# Patient Record
Sex: FEMALE | Race: WHITE | Employment: FULL TIME | URBAN - METROPOLITAN AREA
[De-identification: names, ages, dates, MRNs, and addresses within clinical notes are randomized per-mention and may not be internally consistent; named-entity substitution may affect disease eponyms.]

---

## 2018-06-23 ENCOUNTER — HOSPITAL ENCOUNTER (EMERGENCY)
Age: 28
Discharge: HOME | End: 2018-06-24
Attending: EMERGENCY MEDICINE | Admitting: EMERGENCY MEDICINE
Payer: SELF-PAY

## 2018-06-23 DIAGNOSIS — R51.9 ACUTE NONINTRACTABLE HEADACHE, UNSPECIFIED HEADACHE TYPE: Primary | ICD-10-CM

## 2018-06-23 DIAGNOSIS — R11.2 NON-INTRACTABLE VOMITING WITH NAUSEA, UNSPECIFIED VOMITING TYPE: ICD-10-CM

## 2018-06-23 PROCEDURE — 74011250636 HC RX REV CODE- 250/636: Performed by: EMERGENCY MEDICINE

## 2018-06-23 PROCEDURE — 96374 THER/PROPH/DIAG INJ IV PUSH: CPT

## 2018-06-23 PROCEDURE — 96375 TX/PRO/DX INJ NEW DRUG ADDON: CPT

## 2018-06-23 PROCEDURE — 99284 EMERGENCY DEPT VISIT MOD MDM: CPT

## 2018-06-23 PROCEDURE — 74011000250 HC RX REV CODE- 250: Performed by: EMERGENCY MEDICINE

## 2018-06-23 PROCEDURE — 96361 HYDRATE IV INFUSION ADD-ON: CPT

## 2018-06-23 RX ORDER — LORAZEPAM 2 MG/ML
0.5 INJECTION INTRAMUSCULAR ONCE
Status: COMPLETED | OUTPATIENT
Start: 2018-06-23 | End: 2018-06-24

## 2018-06-23 RX ORDER — DEXAMETHASONE SODIUM PHOSPHATE 10 MG/ML
10 INJECTION INTRAMUSCULAR; INTRAVENOUS
Status: COMPLETED | OUTPATIENT
Start: 2018-06-23 | End: 2018-06-24

## 2018-06-23 RX ORDER — DIPHENHYDRAMINE HYDROCHLORIDE 50 MG/ML
25 INJECTION, SOLUTION INTRAMUSCULAR; INTRAVENOUS
Status: COMPLETED | OUTPATIENT
Start: 2018-06-23 | End: 2018-06-23

## 2018-06-23 RX ADMIN — DIPHENHYDRAMINE HYDROCHLORIDE 25 MG: 50 INJECTION, SOLUTION INTRAMUSCULAR; INTRAVENOUS at 22:42

## 2018-06-23 RX ADMIN — SODIUM CHLORIDE 10 MG: 9 INJECTION INTRAMUSCULAR; INTRAVENOUS; SUBCUTANEOUS at 22:42

## 2018-06-23 RX ADMIN — SODIUM CHLORIDE 1000 ML: 900 INJECTION, SOLUTION INTRAVENOUS at 22:42

## 2018-06-24 VITALS
TEMPERATURE: 98.2 F | BODY MASS INDEX: 23.04 KG/M2 | WEIGHT: 130 LBS | SYSTOLIC BLOOD PRESSURE: 98 MMHG | RESPIRATION RATE: 16 BRPM | DIASTOLIC BLOOD PRESSURE: 58 MMHG | OXYGEN SATURATION: 100 % | HEART RATE: 83 BPM | HEIGHT: 63 IN

## 2018-06-24 PROCEDURE — 96375 TX/PRO/DX INJ NEW DRUG ADDON: CPT

## 2018-06-24 PROCEDURE — 74011250636 HC RX REV CODE- 250/636: Performed by: EMERGENCY MEDICINE

## 2018-06-24 RX ORDER — ONDANSETRON 8 MG/1
8 TABLET, ORALLY DISINTEGRATING ORAL
Qty: 12 TAB | Refills: 0 | Status: SHIPPED | OUTPATIENT
Start: 2018-06-24 | End: 2018-07-01

## 2018-06-24 RX ADMIN — DEXAMETHASONE SODIUM PHOSPHATE 10 MG: 10 INJECTION, SOLUTION INTRAMUSCULAR; INTRAVENOUS at 00:06

## 2018-06-24 RX ADMIN — LORAZEPAM 0.5 MG: 2 INJECTION INTRAMUSCULAR; INTRAVENOUS at 00:06

## 2018-06-24 NOTE — ED TRIAGE NOTES
Patient arrives for complaint of headache that started about 2 weeks ago and has since worsened. No relief with tylenol and ibuprofen.

## 2018-06-24 NOTE — DISCHARGE INSTRUCTIONS
Headache: Care Instructions  Your Care Instructions    Headaches have many possible causes. Most headaches aren't a sign of a more serious problem, and they will get better on their own. Home treatment may help you feel better faster. The doctor has checked you carefully, but problems can develop later. If you notice any problems or new symptoms, get medical treatment right away. Follow-up care is a key part of your treatment and safety. Be sure to make and go to all appointments, and call your doctor if you are having problems. It's also a good idea to know your test results and keep a list of the medicines you take. How can you care for yourself at home? · Do not drive if you have taken a prescription pain medicine. · Rest in a quiet, dark room until your headache is gone. Close your eyes and try to relax or go to sleep. Don't watch TV or read. · Put a cold, moist cloth or cold pack on the painful area for 10 to 20 minutes at a time. Put a thin cloth between the cold pack and your skin. · Use a warm, moist towel or a heating pad set on low to relax tight shoulder and neck muscles. · Have someone gently massage your neck and shoulders. · Take pain medicines exactly as directed. ¨ If the doctor gave you a prescription medicine for pain, take it as prescribed. ¨ If you are not taking a prescription pain medicine, ask your doctor if you can take an over-the-counter medicine. · Be careful not to take pain medicine more often than the instructions allow, because you may get worse or more frequent headaches when the medicine wears off. · Do not ignore new symptoms that occur with a headache, such as a fever, weakness or numbness, vision changes, or confusion. These may be signs of a more serious problem. To prevent headaches  · Keep a headache diary so you can figure out what triggers your headaches. Avoiding triggers may help you prevent headaches.  Record when each headache began, how long it lasted, and what the pain was like (throbbing, aching, stabbing, or dull). Write down any other symptoms you had with the headache, such as nausea, flashing lights or dark spots, or sensitivity to bright light or loud noise. Note if the headache occurred near your period. List anything that might have triggered the headache, such as certain foods (chocolate, cheese, wine) or odors, smoke, bright light, stress, or lack of sleep. · Find healthy ways to deal with stress. Headaches are most common during or right after stressful times. Take time to relax before and after you do something that has caused a headache in the past.  · Try to keep your muscles relaxed by keeping good posture. Check your jaw, face, neck, and shoulder muscles for tension, and try relaxing them. When sitting at a desk, change positions often, and stretch for 30 seconds each hour. · Get plenty of sleep and exercise. · Eat regularly and well. Long periods without food can trigger a headache. · Treat yourself to a massage. Some people find that regular massages are very helpful in relieving tension. · Limit caffeine by not drinking too much coffee, tea, or soda. But don't quit caffeine suddenly, because that can also give you headaches. · Reduce eyestrain from computers by blinking frequently and looking away from the computer screen every so often. Make sure you have proper eyewear and that your monitor is set up properly, about an arm's length away. · Seek help if you have depression or anxiety. Your headaches may be linked to these conditions. Treatment can both prevent headaches and help with symptoms of anxiety or depression. When should you call for help? Call 911 anytime you think you may need emergency care. For example, call if:  ? · You have signs of a stroke. These may include:  ¨ Sudden numbness, paralysis, or weakness in your face, arm, or leg, especially on only one side of your body. ¨ Sudden vision changes.   ¨ Sudden trouble speaking. ¨ Sudden confusion or trouble understanding simple statements. ¨ Sudden problems with walking or balance. ¨ A sudden, severe headache that is different from past headaches. ?Call your doctor now or seek immediate medical care if:  ? · You have a new or worse headache. ? · Your headache gets much worse. Where can you learn more? Go to http://silvia-jocelynn.info/. Enter M271 in the search box to learn more about \"Headache: Care Instructions. \"  Current as of: October 14, 2016  Content Version: 11.4  © 9448-8759 [employer]. Care instructions adapted under license by [employer] (which disclaims liability or warranty for this information). If you have questions about a medical condition or this instruction, always ask your healthcare professional. [address] any warranty or liability for your use of this information. Nausea and Vomiting: Care Instructions  Your Care Instructions    When you are nauseated, you may feel weak and sweaty and notice a lot of saliva in your mouth. Nausea often leads to vomiting. Most of the time you do not need to worry about nausea and vomiting, but they can be signs of other illnesses. Two common causes of nausea and vomiting are stomach flu and food poisoning. Nausea and vomiting from viral stomach flu will usually start to improve within 24 hours. Nausea and vomiting from food poisoning may last from 12 to 48 hours. The doctor has checked you carefully, but problems can develop later. If you notice any problems or new symptoms, get medical treatment right away. Follow-up care is a key part of your treatment and safety. Be sure to make and go to all appointments, and call your doctor if you are having problems. It's also a good idea to know your test results and keep a list of the medicines you take. How can you care for yourself at home?   · To prevent dehydration, drink plenty of fluids, enough so that your urine is light yellow or clear like water. Choose water and other caffeine-free clear liquids until you feel better. If you have kidney, heart, or liver disease and have to limit fluids, talk with your doctor before you increase the amount of fluids you drink. · Rest in bed until you feel better. · When you are able to eat, try clear soups, mild foods, and liquids until all symptoms are gone for 12 to 48 hours. Other good choices include dry toast, crackers, cooked cereal, and gelatin dessert, such as Jell-O. When should you call for help? Call 911 anytime you think you may need emergency care. For example, call if:  ? · You passed out (lost consciousness). ?Call your doctor now or seek immediate medical care if:  ? · You have symptoms of dehydration, such as:  ¨ Dry eyes and a dry mouth. ¨ Passing only a little dark urine. ¨ Feeling thirstier than usual.   ? · You have new or worsening belly pain. ? · You have a new or higher fever. ? · You vomit blood or what looks like coffee grounds. ? Watch closely for changes in your health, and be sure to contact your doctor if:  ? · You have ongoing nausea and vomiting. ? · Your vomiting is getting worse. ? · Your vomiting lasts longer than 2 days. ? · You are not getting better as expected. Where can you learn more? Go to http://silvia-jocelynn.info/. Enter 25 [id number] in the search box to learn more about \"Nausea and Vomiting: Care Instructions. \"  Current as of: March 20, 2017  Content Version: 11.4  © 4578-0356 [employer]. Care instructions adapted under license by [employer] (which disclaims liability or warranty for this information). If you have questions about a medical condition or this instruction, always ask your healthcare professional. [address] any warranty or liability for your use of this information.

## 2018-06-24 NOTE — ED PROVIDER NOTES
HPI Comments: 33 y.o. female with past medical history significant for migraine who presents via private vehicle with chief complaint of HA. Pt reports gradual onset of HA that is localized to the left side of her forehead and behind her left eye 2 weeks ago that has persisted, progressively worsening in severity since onset. Pt reports currently her pain is 10/10 and notes nausea and vomiting x 2 today. Pt endorses previous hx of migraines but states her pain has never been this severe. Pt also notes chronic extremity numbness and weakness that remain unchanged today. Pt reports she has had a head CT in the past for HA. Pt denies facial numbness and facial weakness. Pt denies possibility of pregnancy. Pt denies fever, CP and SOB. There are no other acute medical concerns at this time. Social hx: , tobacco smoker  PCP: Fracisco Flores MD    Note written by Lucy Waters, as dictated by Lizbet Rodriguez MD 10:35 PM      The history is provided by the patient. No past medical history on file. No past surgical history on file. Family History:   Problem Relation Age of Onset    Alcohol abuse Mother     Substance Abuse Mother     Alcohol abuse Father     Diabetes Maternal Grandmother     Heart Disease Maternal Grandmother        Social History     Social History    Marital status:      Spouse name: N/A    Number of children: N/A    Years of education: N/A     Occupational History    Not on file. Social History Main Topics    Smoking status: Current Every Day Smoker     Packs/day: 0.50     Years: 2.00     Last attempt to quit: 3/28/2009    Smokeless tobacco: Not on file    Alcohol use No    Drug use: Not on file    Sexual activity: Not on file     Other Topics Concern    Not on file     Social History Narrative    ** Merged History Encounter **              ALLERGIES: Latex;  Ceclor [cefaclor]; and Ceclor [cefaclor]    Review of Systems   Constitutional: Negative for fever. Respiratory: Negative for shortness of breath. Cardiovascular: Negative for chest pain. Gastrointestinal: Positive for nausea and vomiting. Neurological: Positive for headaches. All other systems reviewed and are negative. Vitals:    06/23/18 2146   BP: 123/76   Pulse: 83   Resp: 16   Temp: 98.2 °F (36.8 °C)   SpO2: 100%   Weight: 59 kg (130 lb)   Height: 5' 3\" (1.6 m)            Physical Exam   Constitutional: She is oriented to person, place, and time. She appears well-developed and well-nourished. HENT:   Head: Normocephalic and atraumatic. Mouth/Throat: Oropharynx is clear and moist.   Eyes: Conjunctivae are normal.   Neck: Normal range of motion. Neck supple. Cardiovascular: Normal rate, regular rhythm and normal heart sounds. Pulmonary/Chest: Effort normal and breath sounds normal.   Abdominal: Soft. Bowel sounds are normal. There is no tenderness. Musculoskeletal: Normal range of motion. She exhibits no edema or tenderness. Neurological: She is alert and oriented to person, place, and time. Skin: Skin is warm and dry. Psychiatric: She has a normal mood and affect. Her behavior is normal.   Nursing note and vitals reviewed. Note written by Lucy Daly, as dictated by Polo Reinoso MD 10:35 PM    [facility]      ED Course       Procedures    PROGRESS NOTE:  11:33 PM  Pt reports she is feeling jittery after compazine but her HA is currently a 3/10. PROGRESS NOTE:  12:19 AM  Pain is a 3/10, she feels well enough to go home. A/P:  1. Headache - improved. Recommend Excedrin as needed. 2. N/V - Zofran prn. Patient's results have been reviewed with them. Patient and/or family have verbally conveyed their understanding and agreement of the patient's signs, symptoms, diagnosis, treatment and prognosis and additionally agree to follow up as recommended or return to the Emergency Room should their condition change prior to follow-up.   Discharge instructions have also been provided to the patient with some educational information regarding their diagnosis as well a list of reasons why they would want to return to the ER prior to their follow-up appointment should their condition change.

## 2020-05-08 ENCOUNTER — APPOINTMENT (OUTPATIENT)
Dept: CT IMAGING | Age: 30
End: 2020-05-08
Attending: NURSE PRACTITIONER
Payer: MEDICAID

## 2020-05-08 ENCOUNTER — HOSPITAL ENCOUNTER (EMERGENCY)
Age: 30
Discharge: HOME | End: 2020-05-08
Attending: STUDENT IN AN ORGANIZED HEALTH CARE EDUCATION/TRAINING PROGRAM
Payer: MEDICAID

## 2020-05-08 VITALS
TEMPERATURE: 97.4 F | BODY MASS INDEX: 26.22 KG/M2 | HEART RATE: 91 BPM | RESPIRATION RATE: 16 BRPM | HEIGHT: 63 IN | OXYGEN SATURATION: 100 % | SYSTOLIC BLOOD PRESSURE: 136 MMHG | DIASTOLIC BLOOD PRESSURE: 79 MMHG | WEIGHT: 148 LBS

## 2020-05-08 DIAGNOSIS — R10.84 ABDOMINAL PAIN, GENERALIZED: Primary | ICD-10-CM

## 2020-05-08 DIAGNOSIS — K59.00 CONSTIPATION, UNSPECIFIED CONSTIPATION TYPE: ICD-10-CM

## 2020-05-08 LAB
ALBUMIN SERPL-MCNC: 4.3 G/DL (ref 3.5–5)
ALBUMIN/GLOB SERPL: 1.1 {RATIO} (ref 1.1–2.2)
ALP SERPL-CCNC: 89 U/L (ref 45–117)
ALT SERPL-CCNC: 21 U/L (ref 12–78)
ANION GAP SERPL CALC-SCNC: 5 MMOL/L (ref 5–15)
APPEARANCE UR: CLEAR
AST SERPL-CCNC: 12 U/L (ref 15–37)
BACTERIA URNS QL MICRO: NEGATIVE /HPF
BASOPHILS # BLD: 0 K/UL (ref 0–0.1)
BASOPHILS NFR BLD: 0 % (ref 0–1)
BILIRUB SERPL-MCNC: 0.5 MG/DL (ref 0.2–1)
BILIRUB UR QL: NEGATIVE
BUN SERPL-MCNC: 11 MG/DL (ref 6–20)
BUN/CREAT SERPL: 13 (ref 12–20)
CALCIUM SERPL-MCNC: 9 MG/DL (ref 8.5–10.1)
CHLORIDE SERPL-SCNC: 105 MMOL/L (ref 97–108)
CO2 SERPL-SCNC: 27 MMOL/L (ref 21–32)
COLOR UR: NORMAL
COMMENT, HOLDF: NORMAL
CREAT SERPL-MCNC: 0.83 MG/DL (ref 0.55–1.02)
DIFFERENTIAL METHOD BLD: NORMAL
EOSINOPHIL # BLD: 0.3 K/UL (ref 0–0.4)
EOSINOPHIL NFR BLD: 4 % (ref 0–7)
EPITH CASTS URNS QL MICRO: NORMAL /LPF
ERYTHROCYTE [DISTWIDTH] IN BLOOD BY AUTOMATED COUNT: 12.5 % (ref 11.5–14.5)
GLOBULIN SER CALC-MCNC: 3.9 G/DL (ref 2–4)
GLUCOSE SERPL-MCNC: 107 MG/DL (ref 65–100)
GLUCOSE UR STRIP.AUTO-MCNC: NEGATIVE MG/DL
HCG UR QL: NEGATIVE
HCT VFR BLD AUTO: 45.7 % (ref 35–47)
HGB BLD-MCNC: 15.2 G/DL (ref 11.5–16)
HGB UR QL STRIP: NEGATIVE
IMM GRANULOCYTES # BLD AUTO: 0 K/UL (ref 0–0.04)
IMM GRANULOCYTES NFR BLD AUTO: 0 % (ref 0–0.5)
KETONES UR QL STRIP.AUTO: NEGATIVE MG/DL
LEUKOCYTE ESTERASE UR QL STRIP.AUTO: NEGATIVE
LYMPHOCYTES # BLD: 2.7 K/UL (ref 0.8–3.5)
LYMPHOCYTES NFR BLD: 30 % (ref 12–49)
MCH RBC QN AUTO: 30.1 PG (ref 26–34)
MCHC RBC AUTO-ENTMCNC: 33.3 G/DL (ref 30–36.5)
MCV RBC AUTO: 90.5 FL (ref 80–99)
MONOCYTES # BLD: 0.6 K/UL (ref 0–1)
MONOCYTES NFR BLD: 6 % (ref 5–13)
NEUTS SEG # BLD: 5.3 K/UL (ref 1.8–8)
NEUTS SEG NFR BLD: 60 % (ref 32–75)
NITRITE UR QL STRIP.AUTO: NEGATIVE
NRBC # BLD: 0 K/UL (ref 0–0.01)
NRBC BLD-RTO: 0 PER 100 WBC
PH UR STRIP: 5.5 [PH] (ref 5–8)
PLATELET # BLD AUTO: 262 K/UL (ref 150–400)
PMV BLD AUTO: 10 FL (ref 8.9–12.9)
POTASSIUM SERPL-SCNC: 3.7 MMOL/L (ref 3.5–5.1)
PROT SERPL-MCNC: 8.2 G/DL (ref 6.4–8.2)
PROT UR STRIP-MCNC: NEGATIVE MG/DL
RBC # BLD AUTO: 5.05 M/UL (ref 3.8–5.2)
RBC #/AREA URNS HPF: NORMAL /HPF (ref 0–5)
SAMPLES BEING HELD,HOLD: NORMAL
SODIUM SERPL-SCNC: 137 MMOL/L (ref 136–145)
SP GR UR REFRACTOMETRY: 1.02 (ref 1–1.03)
UA: UC IF INDICATED,UAUC: NORMAL
UROBILINOGEN UR QL STRIP.AUTO: 0.2 EU/DL (ref 0.2–1)
WBC # BLD AUTO: 8.9 K/UL (ref 3.6–11)
WBC URNS QL MICRO: NORMAL /HPF (ref 0–4)

## 2020-05-08 PROCEDURE — 85025 COMPLETE CBC W/AUTO DIFF WBC: CPT

## 2020-05-08 PROCEDURE — 81001 URINALYSIS AUTO W/SCOPE: CPT

## 2020-05-08 PROCEDURE — 74011636320 HC RX REV CODE- 636/320: Performed by: RADIOLOGY

## 2020-05-08 PROCEDURE — 81025 URINE PREGNANCY TEST: CPT

## 2020-05-08 PROCEDURE — 80053 COMPREHEN METABOLIC PANEL: CPT

## 2020-05-08 PROCEDURE — 99283 EMERGENCY DEPT VISIT LOW MDM: CPT

## 2020-05-08 PROCEDURE — 36415 COLL VENOUS BLD VENIPUNCTURE: CPT

## 2020-05-08 PROCEDURE — 74177 CT ABD & PELVIS W/CONTRAST: CPT

## 2020-05-08 RX ORDER — POLYETHYLENE GLYCOL 3350 17 G/17G
17 POWDER, FOR SOLUTION ORAL DAILY
Qty: 510 G | Refills: 0 | Status: SHIPPED | OUTPATIENT
Start: 2020-05-08 | End: 2021-04-06

## 2020-05-08 RX ADMIN — IOPAMIDOL 100 ML: 755 INJECTION, SOLUTION INTRAVENOUS at 14:15

## 2020-05-08 NOTE — ED PROVIDER NOTES
This is a 18-year-old female who presents ambulatory to the emergency room with complaints of left flank and lower abdominal pain that has been going on for approximately 3 days. Patient states it was acute onset. Denies any chest pain, shortness of breath, dizziness, fever or chills, nausea or vomiting. Denies any urinary urgency or frequency, hematuria. Last bowel movement was this morning , very small, with no blood noted. Patient states the pain is a 5 out of 10 currently. Has not taken any medication prior to arrival for her pain. There are no further complaints at this time. Mer Roy MD  No past medical history on file. No past surgical history on file. No past medical history on file. No past surgical history on file.       Family History:   Problem Relation Age of Onset    Alcohol abuse Mother     Substance Abuse Mother     Alcohol abuse Father     Diabetes Maternal Grandmother     Heart Disease Maternal Grandmother        Social History     Socioeconomic History    Marital status:      Spouse name: Not on file    Number of children: Not on file    Years of education: Not on file    Highest education level: Not on file   Occupational History    Not on file   Social Needs    Financial resource strain: Not on file    Food insecurity     Worry: Not on file     Inability: Not on file    Transportation needs     Medical: Not on file     Non-medical: Not on file   Tobacco Use    Smoking status: Current Every Day Smoker     Packs/day: 0.50     Years: 2.00     Pack years: 1.00     Last attempt to quit: 3/28/2009     Years since quittin.1   Substance and Sexual Activity    Alcohol use: No    Drug use: Not on file    Sexual activity: Not on file   Lifestyle    Physical activity     Days per week: Not on file     Minutes per session: Not on file    Stress: Not on file   Relationships    Social connections     Talks on phone: Not on file     Gets together: Not on file     Attends [employer] service: Not on file     Active member of club or organization: Not on file     Attends meetings of clubs or organizations: Not on file     Relationship status: Not on file    Intimate partner violence     Fear of current or ex partner: Not on file     Emotionally abused: Not on file     Physically abused: Not on file     Forced sexual activity: Not on file   Other Topics Concern    Not on file   Social History Narrative    ** Merged History Encounter **              ALLERGIES: Latex; Ceclor [cefaclor]; and Ceclor [cefaclor]    Review of Systems   Constitutional: Negative for appetite change, chills, diaphoresis, fatigue and fever. HENT: Negative for congestion, ear discharge, ear pain, sinus pressure, sinus pain, sore throat and trouble swallowing. Eyes: Negative for photophobia, pain, redness and visual disturbance. Respiratory: Negative for chest tightness, shortness of breath and wheezing. Cardiovascular: Negative for chest pain and palpitations. Gastrointestinal: Positive for abdominal pain and constipation. Negative for abdominal distention, nausea and vomiting. Endocrine: Negative. Genitourinary: Negative for difficulty urinating, flank pain, frequency and urgency. Musculoskeletal: Negative for back pain, neck pain and neck stiffness. Skin: Negative for color change, pallor, rash and wound. Allergic/Immunologic: Negative. Neurological: Negative for dizziness, speech difficulty, weakness and headaches. Hematological: Does not bruise/bleed easily. Psychiatric/Behavioral: Negative for behavioral problems. The patient is not nervous/anxious. Vitals:    05/08/20 1209   BP: 136/79   Pulse: 91   Resp: 16   Temp: 97.4 °F (36.3 °C)   SpO2: 100%   Weight: 67.1 kg (148 lb)   Height: 5' 3\" (1.6 m)            Physical Exam  Vitals signs and nursing note reviewed. Constitutional:       General: She is not in acute distress.      Appearance: Normal appearance. She is well-developed. HENT:      Head: Normocephalic and atraumatic. Right Ear: External ear normal.      Left Ear: External ear normal.      Nose: Nose normal.      Mouth/Throat:      Mouth: Mucous membranes are moist.   Eyes:      General:         Right eye: No discharge. Left eye: No discharge. Conjunctiva/sclera: Conjunctivae normal.      Pupils: Pupils are equal, round, and reactive to light. Neck:      Musculoskeletal: Normal range of motion and neck supple. Vascular: No JVD. Trachea: No tracheal deviation. Cardiovascular:      Rate and Rhythm: Normal rate and regular rhythm. Pulses: Normal pulses. Heart sounds: Normal heart sounds. No murmur. No gallop. Pulmonary:      Effort: Pulmonary effort is normal. No respiratory distress. Breath sounds: Normal breath sounds. No wheezing or rales. Chest:      Chest wall: No tenderness. Abdominal:      General: Bowel sounds are normal. There is no distension. Palpations: Abdomen is soft. Tenderness: There is abdominal tenderness. There is no guarding or rebound. Genitourinary:     Comments: Negative    Musculoskeletal: Normal range of motion. General: No tenderness. Skin:     General: Skin is warm and dry. Capillary Refill: Capillary refill takes less than 2 seconds. Coloration: Skin is not pale. Findings: No erythema or rash. Neurological:      General: No focal deficit present. Mental Status: She is alert and oriented to person, place, and time. Motor: No weakness. Coordination: Coordination normal.   Psychiatric:         Mood and Affect: Mood normal.         Behavior: Behavior normal.         Thought Content:  Thought content normal.         Judgment: Judgment normal.          MDM  Number of Diagnoses or Management Options  Abdominal pain, generalized: new and requires workup  Constipation, unspecified constipation type: new and requires workup  Diagnosis management comments: Discharged home and follow-up with PCP. Follow-up with GI as an outpatient. Return to the emergency room with worsening symptoms. Patient in agreement with plan of care. Labs Reviewed   METABOLIC PANEL, COMPREHENSIVE - Abnormal; Notable for the following components:       Result Value    Glucose 107 (*)     AST (SGOT) 12 (*)     All other components within normal limits   URINALYSIS W/ REFLEX CULTURE   CBC WITH AUTOMATED DIFF   SAMPLES BEING HELD   HCG URINE, QL. - POC   SAMPLE TO BLOOD BANK     Ct Abd Pelv W Cont    Result Date: 5/8/2020  IMPRESSION: No acute abdominal or pelvic pathology. 2:39 PM  Pt has been reexamined. Pt has no new complaints, changes or physical findings. Care plan outlined and precautions discussed. All available results were reviewed with pt. All medications were reviewed with pt. All of pt's questions and concerns were addressed. Pt agrees to F/U as instructed and agrees to return to ED upon further deterioration. Pt is ready to go home.   Francisco Javier Thomason NP      Procedures

## 2020-05-08 NOTE — LETTER
[address] 
[facility] EMERGENCY DEPT 
[address] [place] [address] [phone] Work/School Note Date: 5/8/2020 To Whom It May concern: 
 
Dinorah Blankenship was seen and treated today in the emergency room by the following provider(s): 
Attending Provider: Carol Vail MD 
Nurse Practitioner: Gabriela Boas, NP.    
 
 
 
Sincerely,

## 2020-05-08 NOTE — LETTER
[address] 
[facility] EMERGENCY DEPT 
[address] [place] [address] [phone] Work/School Note Date: 5/8/2020 To Whom It May concern: 
 
Nathaly Castellano was seen and treated today in the emergency room by the following provider(s): 
Nurse Practitioner: Beatriz Salcedo NP. Nathaly Castellano may return to work on 5/11/2020. Sincerely, Lidia Hollis RN

## 2020-05-08 NOTE — DISCHARGE INSTRUCTIONS
Patient Education        Abdominal Pain: Care Instructions  Your Care Instructions    Abdominal pain has many possible causes. Some aren't serious and get better on their own in a few days. Others need more testing and treatment. If your pain continues or gets worse, you need to be rechecked and may need more tests to find out what is wrong. You may need surgery to correct the problem. Don't ignore new symptoms, such as fever, nausea and vomiting, urination problems, pain that gets worse, and dizziness. These may be signs of a more serious problem. Your doctor may have recommended a follow-up visit in the next 8 to 12 hours. If you are not getting better, you may need more tests or treatment. The doctor has checked you carefully, but problems can develop later. If you notice any problems or new symptoms, get medical treatment right away. Follow-up care is a key part of your treatment and safety. Be sure to make and go to all appointments, and call your doctor if you are having problems. It's also a good idea to know your test results and keep a list of the medicines you take. How can you care for yourself at home? · Rest until you feel better. · To prevent dehydration, drink plenty of fluids, enough so that your urine is light yellow or clear like water. Choose water and other caffeine-free clear liquids until you feel better. If you have kidney, heart, or liver disease and have to limit fluids, talk with your doctor before you increase the amount of fluids you drink. · If your stomach is upset, eat mild foods, such as rice, dry toast or crackers, bananas, and applesauce. Try eating several small meals instead of two or three large ones. · Wait until 48 hours after all symptoms have gone away before you have spicy foods, alcohol, and drinks that contain caffeine. · Do not eat foods that are high in fat. · Avoid anti-inflammatory medicines such as aspirin, ibuprofen (Advil, Motrin), and naproxen (Aleve). These can cause stomach upset. Talk to your doctor if you take daily aspirin for another health problem. When should you call for help? Call 911 anytime you think you may need emergency care. For example, call if:    · You passed out (lost consciousness).     · You pass maroon or very bloody stools.     · You vomit blood or what looks like coffee grounds.     · You have new, severe belly pain.    Call your doctor now or seek immediate medical care if:    · Your pain gets worse, especially if it becomes focused in one area of your belly.     · You have a new or higher fever.     · Your stools are black and look like tar, or they have streaks of blood.     · You have unexpected vaginal bleeding.     · You have symptoms of a urinary tract infection. These may include:  ? Pain when you urinate. ? Urinating more often than usual.  ? Blood in your urine.     · You are dizzy or lightheaded, or you feel like you may faint.    Watch closely for changes in your health, and be sure to contact your doctor if:    · You are not getting better after 1 day (24 hours). Where can you learn more? Go to http://silviaAyasdijocelynn.info/  Enter X063 in the search box to learn more about \"Abdominal Pain: Care Instructions. \"  Current as of: June 26, 2019Content Version: 12.4  © 9123-4752 Healthwise, Incorporated. Care instructions adapted under license by [employer] (which disclaims liability or warranty for this information). If you have questions about a medical condition or this instruction, always ask your healthcare professional. [place] [address] any warranty or liability for your use of this information. Patient Education        Constipation: Care Instructions  Your Care Instructions    Constipation means that you have a hard time passing stools (bowel movements). People pass stools from 3 times a day to once every 3 days. What is normal for you may be different.  Constipation may occur with pain in the rectum and cramping. The pain may get worse when you try to pass stools. Sometimes there are small amounts of bright red blood on toilet paper or the surface of stools. This is because of enlarged veins near the rectum (hemorrhoids). A few changes in your diet and lifestyle may help you avoid ongoing constipation. Your doctor may also prescribe medicine to help loosen your stool. Some medicines can cause constipation. These include pain medicines and antidepressants. Tell your doctor about all the medicines you take. Your doctor may want to make a medicine change to ease your symptoms. Follow-up care is a key part of your treatment and safety. Be sure to make and go to all appointments, and call your doctor if you are having problems. It's also a good idea to know your test results and keep a list of the medicines you take. How can you care for yourself at home? · Drink plenty of fluids, enough so that your urine is light yellow or clear like water. If you have kidney, heart, or liver disease and have to limit fluids, talk with your doctor before you increase the amount of fluids you drink. · Include high-fiber foods in your diet each day. These include fruits, vegetables, beans, and whole grains. · Get at least 30 minutes of exercise on most days of the week. Walking is a good choice. You also may want to do other activities, such as running, swimming, cycling, or playing tennis or team sports. · Take a fiber supplement, such as Citrucel or Metamucil, every day. Read and follow all instructions on the label. · Schedule time each day for a bowel movement. A daily routine may help. Take your time having your bowel movement. · Support your feet with a small step stool when you sit on the toilet. This helps flex your hips and places your pelvis in a squatting position. · Your doctor may recommend an over-the-counter laxative to relieve your constipation.  Examples are Milk of Magnesia and MiraLax. Read and follow all instructions on the label. Do not use laxatives on a long-term basis. When should you call for help? Call your doctor now or seek immediate medical care if:    · You have new or worse belly pain.     · You have new or worse nausea or vomiting.     · You have blood in your stools.    Watch closely for changes in your health, and be sure to contact your doctor if:    · Your constipation is getting worse.     · You do not get better as expected. Where can you learn more? Go to http://silvia-jocelynn.info/  Enter P343 in the search box to learn more about \"Constipation: Care Instructions. \"  Current as of: June 26, 2019Content Version: 12.4  © 5082-6447 Healthwise, Incorporated. Care instructions adapted under license by [employer] (which disclaims liability or warranty for this information). If you have questions about a medical condition or this instruction, always ask your healthcare professional. [place] [address] any warranty or liability for your use of this information.

## 2020-05-08 NOTE — ED TRIAGE NOTES
Pt reports left side and lower abdominal pain that has been going on x 3 days. Pt denies vomiting, fevers, diarrhea, urinary symptoms.

## 2020-05-11 ENCOUNTER — PATIENT OUTREACH (OUTPATIENT)
Dept: FAMILY MEDICINE CLINIC | Age: 30
End: 2020-05-11

## 2020-05-11 NOTE — PROGRESS NOTES
Patient contacted regarding recent discharge and COVID-19 risk   Care Transition Nurse/ Ambulatory Care Manager contacted the patient by telephone to perform post discharge assessment. Verified name and  with patient as identifiers. Patient has following risk factors of: no known risk factors. CTN/ACM reviewed discharge instructions, medical action plan and red flags related to discharge diagnosis. Reviewed and educated them on any new and changed medications related to discharge diagnosis. Advised obtaining a 90-day supply of all daily and as-needed medications. Education provided regarding infection prevention, and signs and symptoms of COVID-19 and when to seek medical attention with patient who verbalized understanding. Discussed exposure protocols and quarantine from [address] you at higher risk for severe illness  and given an opportunity for questions and concerns. The patient agrees to contact the COVID-19 hotline [phone] or PCP office for questions related to their healthcare. CTN/ACM provided contact information for future reference. From CDC: Are you at higher risk for severe illness?  Wash your hands often.  Avoid close contact (6 feet, which is about two arm lengths) with people who are sick.  Put distance between yourself and other people if COVID-19 is spreading in your community.  Clean and disinfect frequently touched surfaces.  Avoid all cruise travel and non-essential air travel.  Call your healthcare professional if you have concerns about COVID-19 and your underlying condition or if you are sick. For more information on steps you can take to protect yourself, see CDC's How to Protect Yourself      Patient/family/caregiver given information for Christiano Love and agrees to enroll yes  Patient's preferred e-mail:  [email or web address]  Patient's preferred phone number: [phone]  Based on Loop alert triggers, patient will be contacted by nurse care manager for worsening symptoms. Pt will be further monitored by COVID Loop Team based on severity of symptoms and risk factors.

## 2021-03-11 ENCOUNTER — TRANSCRIBE ORDER (OUTPATIENT)
Dept: SCHEDULING | Age: 31
End: 2021-03-11

## 2021-03-11 DIAGNOSIS — M54.10 RADICULOPATHY OF ARM: Primary | ICD-10-CM

## 2021-03-18 ENCOUNTER — HOSPITAL ENCOUNTER (OUTPATIENT)
Dept: MRI IMAGING | Age: 31
Discharge: HOME | End: 2021-03-18
Attending: STUDENT IN AN ORGANIZED HEALTH CARE EDUCATION/TRAINING PROGRAM
Payer: COMMERCIAL

## 2021-03-18 DIAGNOSIS — M54.10 RADICULOPATHY OF ARM: ICD-10-CM

## 2021-03-18 PROCEDURE — 72141 MRI NECK SPINE W/O DYE: CPT

## 2021-04-06 ENCOUNTER — HOSPITAL ENCOUNTER (OUTPATIENT)
Dept: PREADMISSION TESTING | Age: 31
Discharge: HOME | End: 2021-04-06
Attending: ORTHOPAEDIC SURGERY
Payer: COMMERCIAL

## 2021-04-06 VITALS
WEIGHT: 141.09 LBS | RESPIRATION RATE: 17 BRPM | HEIGHT: 63 IN | HEART RATE: 85 BPM | DIASTOLIC BLOOD PRESSURE: 67 MMHG | TEMPERATURE: 98.1 F | SYSTOLIC BLOOD PRESSURE: 122 MMHG | OXYGEN SATURATION: 98 % | BODY MASS INDEX: 25 KG/M2

## 2021-04-06 LAB
25(OH)D3 SERPL-MCNC: 15.4 NG/ML (ref 30–100)
ABO + RH BLD: NORMAL
ALBUMIN SERPL-MCNC: 4.1 G/DL (ref 3.5–5)
ALBUMIN/GLOB SERPL: 1.4 {RATIO} (ref 1.1–2.2)
ALP SERPL-CCNC: 76 U/L (ref 45–117)
ALT SERPL-CCNC: 20 U/L (ref 12–78)
ANION GAP SERPL CALC-SCNC: 6 MMOL/L (ref 5–15)
APPEARANCE UR: ABNORMAL
APTT PPP: 28.8 SEC (ref 22.1–31)
AST SERPL-CCNC: 15 U/L (ref 15–37)
ATRIAL RATE: 79 BPM
BACTERIA URNS QL MICRO: NEGATIVE /HPF
BASOPHILS # BLD: 0 K/UL (ref 0–0.1)
BASOPHILS NFR BLD: 1 % (ref 0–1)
BILIRUB SERPL-MCNC: 0.2 MG/DL (ref 0.2–1)
BILIRUB UR QL: NEGATIVE
BLOOD GROUP ANTIBODIES SERPL: NORMAL
BUN SERPL-MCNC: 14 MG/DL (ref 6–20)
BUN/CREAT SERPL: 18 (ref 12–20)
CALCIUM SERPL-MCNC: 9.1 MG/DL (ref 8.5–10.1)
CALCULATED P AXIS, ECG09: 10 DEGREES
CALCULATED R AXIS, ECG10: 41 DEGREES
CALCULATED T AXIS, ECG11: 19 DEGREES
CHLORIDE SERPL-SCNC: 105 MMOL/L (ref 97–108)
CO2 SERPL-SCNC: 27 MMOL/L (ref 21–32)
COLOR UR: ABNORMAL
CREAT SERPL-MCNC: 0.78 MG/DL (ref 0.55–1.02)
DIAGNOSIS, 93000: NORMAL
DIFFERENTIAL METHOD BLD: NORMAL
EOSINOPHIL # BLD: 0.4 K/UL (ref 0–0.4)
EOSINOPHIL NFR BLD: 4 % (ref 0–7)
EPITH CASTS URNS QL MICRO: ABNORMAL /LPF
ERYTHROCYTE [DISTWIDTH] IN BLOOD BY AUTOMATED COUNT: 12.5 % (ref 11.5–14.5)
GLOBULIN SER CALC-MCNC: 2.9 G/DL (ref 2–4)
GLUCOSE SERPL-MCNC: 85 MG/DL (ref 65–100)
GLUCOSE UR STRIP.AUTO-MCNC: NEGATIVE MG/DL
HCT VFR BLD AUTO: 42.2 % (ref 35–47)
HGB BLD-MCNC: 13.7 G/DL (ref 11.5–16)
HGB UR QL STRIP: NEGATIVE
HYALINE CASTS URNS QL MICRO: ABNORMAL /LPF (ref 0–5)
IMM GRANULOCYTES # BLD AUTO: 0 K/UL (ref 0–0.04)
IMM GRANULOCYTES NFR BLD AUTO: 0 % (ref 0–0.5)
INR PPP: 1 (ref 0.9–1.1)
KETONES UR QL STRIP.AUTO: NEGATIVE MG/DL
LEUKOCYTE ESTERASE UR QL STRIP.AUTO: NEGATIVE
LYMPHOCYTES # BLD: 2.4 K/UL (ref 0.8–3.5)
LYMPHOCYTES NFR BLD: 29 % (ref 12–49)
MCH RBC QN AUTO: 29.5 PG (ref 26–34)
MCHC RBC AUTO-ENTMCNC: 32.5 G/DL (ref 30–36.5)
MCV RBC AUTO: 90.9 FL (ref 80–99)
MONOCYTES # BLD: 0.5 K/UL (ref 0–1)
MONOCYTES NFR BLD: 6 % (ref 5–13)
NEUTS SEG # BLD: 5 K/UL (ref 1.8–8)
NEUTS SEG NFR BLD: 60 % (ref 32–75)
NITRITE UR QL STRIP.AUTO: NEGATIVE
NRBC # BLD: 0 K/UL (ref 0–0.01)
NRBC BLD-RTO: 0 PER 100 WBC
P-R INTERVAL, ECG05: 106 MS
PH UR STRIP: 7.5 [PH] (ref 5–8)
PLATELET # BLD AUTO: 247 K/UL (ref 150–400)
PMV BLD AUTO: 10.8 FL (ref 8.9–12.9)
POTASSIUM SERPL-SCNC: 4 MMOL/L (ref 3.5–5.1)
PROT SERPL-MCNC: 7 G/DL (ref 6.4–8.2)
PROT UR STRIP-MCNC: NEGATIVE MG/DL
PROTHROMBIN TIME: 10.2 SEC (ref 9–11.1)
Q-T INTERVAL, ECG07: 384 MS
QRS DURATION, ECG06: 80 MS
QTC CALCULATION (BEZET), ECG08: 440 MS
RBC # BLD AUTO: 4.64 M/UL (ref 3.8–5.2)
RBC #/AREA URNS HPF: ABNORMAL /HPF (ref 0–5)
SODIUM SERPL-SCNC: 138 MMOL/L (ref 136–145)
SP GR UR REFRACTOMETRY: 1.02 (ref 1–1.03)
SPECIMEN EXP DATE BLD: NORMAL
THERAPEUTIC RANGE,PTTT: NORMAL SECS (ref 58–77)
UA: UC IF INDICATED,UAUC: ABNORMAL
UROBILINOGEN UR QL STRIP.AUTO: 0.2 EU/DL (ref 0.2–1)
VENTRICULAR RATE, ECG03: 79 BPM
WBC # BLD AUTO: 8.4 K/UL (ref 3.6–11)
WBC URNS QL MICRO: ABNORMAL /HPF (ref 0–4)

## 2021-04-06 PROCEDURE — 82306 VITAMIN D 25 HYDROXY: CPT

## 2021-04-06 PROCEDURE — 85730 THROMBOPLASTIN TIME PARTIAL: CPT

## 2021-04-06 PROCEDURE — 93005 ELECTROCARDIOGRAM TRACING: CPT

## 2021-04-06 PROCEDURE — 80053 COMPREHEN METABOLIC PANEL: CPT

## 2021-04-06 PROCEDURE — 36415 COLL VENOUS BLD VENIPUNCTURE: CPT

## 2021-04-06 PROCEDURE — 86901 BLOOD TYPING SEROLOGIC RH(D): CPT

## 2021-04-06 PROCEDURE — 85025 COMPLETE CBC W/AUTO DIFF WBC: CPT

## 2021-04-06 PROCEDURE — 83036 HEMOGLOBIN GLYCOSYLATED A1C: CPT

## 2021-04-06 PROCEDURE — 81001 URINALYSIS AUTO W/SCOPE: CPT

## 2021-04-06 PROCEDURE — 85610 PROTHROMBIN TIME: CPT

## 2021-04-06 RX ORDER — METHOCARBAMOL 500 MG/1
500 TABLET, FILM COATED ORAL
COMMUNITY
End: 2021-04-20

## 2021-04-06 NOTE — PERIOP NOTES
It is now mandated that all surgical patients be tested for COVID-19 prior to surgery. Testing has to be exactly 4 days prior to surgery. Your COVID test date is 4/9/21 between 8:00 am and 11:00 am.       COVID testing will be performed curbside at the [address] [place] [address] Dr brewer. There will be signs leading you to the testing site. You will need to bring a photo ID with you to be swabbed. Patients are advised to self-quarantine at home after testing and prior to your surgery date. You will be notified if your results are positive.     What to watch for:   Coronavirus (COVID-19) affects different people in different ways   It also appears with a wide range of symptoms from mild to severe   Signs usually appear 2-14 days after exposure     If you develop any of the following, notify your doctor immediately:  o Fever  o Chills, with or without a shiver  o Muscle pain  o Headache  o Sore throat  o Dry cough  o New loss of taste or smell  o Tiredness      If you develop any of the following, call 911:  o Shortness of breath  o Difficulty breathing  o Chest pain  o New confusion  o Blueness of fingers and/or lips

## 2021-04-06 NOTE — PERIOP NOTES
[address] [facility] [address], [address] [facility]   MAIN OR                                  [phone]   MAIN PRE OP                          [phone]                                                                                AMBULATORY PRE OP          [phone]  PRE-ADMISSION TESTING    [phone]     Surgery Date:  4/13/21       Is surgery arrival time given by surgeon? YES  NO  If NO, [facility] staff will call you between 3 and 7pm the day before your surgery with your arrival time. (If your surgery is on a Monday, we will call you the Friday before.)    Call [phone] after 7pm Monday-Friday if you did not receive this call. INSTRUCTIONS BEFORE YOUR SURGERY   When You  Arrive Arrive at the [address] [facility] on the day of your surgery  Have your insurance card, photo ID, and any copayment (if needed)   Food   and   Drink NO food or drink after midnight the night before surgery    This means NO water, gum, mints, coffee, juice, etc.  No alcohol (beer, wine, liquor) 24 hours before and after surgery   Medications to   TAKE   Morning of Surgery MEDICATIONS TO TAKE THE MORNING OF SURGERY WITH A SIP OF WATER:    none   Medications  To  STOP      7 days before surgery  Non-Steroidal anti-inflammatory Drugs (NSAID's): for example, Ibuprofen (Advil, Motrin), Naproxen (Aleve)   Aspirin, if taking for pain    Herbal supplements, vitamins, and fish oil   Other:  (Pain medications not listed above, including Tylenol may be taken)   Blood  Thinners  If you take  Aspirin, Plavix, Coumadin, or any blood-thinning or anti-blood clot medicine, talk to the doctor who prescribed the medications for pre-operative instructions.    Bathing Clothing  Jewelry  Valuables      If you shower the morning of surgery, please do not apply anything to your skin (lotions, powders, deodorant, or makeup, especially mascara)   Follow Chlorhexidine Care Fusion body wash instructions provided to you during PAT appointment. Begin 3 days prior to surgery.  Do not shave or trim anywhere 24 hours before surgery   Wear your hair loose or down; no pony-tails, buns, or metal hair clips   Wear loose, comfortable, clean clothes   Wear glasses instead of contacts   Leave money, valuables, and jewelry, including body piercings, at home   Going Home - or Spending the Night  SAME-DAY SURGERY: You must have a responsible adult drive you home and stay with you 24 hours after surgery   ADMITS: If your doctor is keeping you in the hospital after surgery, leave personal belongings/luggage in your car until you have a hospital room number. Hospital discharge time is 12 noon  Drivers must be here before 12 noon unless you are told differently   Special Instructions   Special Instructions:  · Use Chlorhexidine Care Fusion wash and sponges 3 days prior to surgery as instructed. · Incentive spirometer given with instructions to practice at home and bring back to the hospital on the day of surgery. · [facility] will contact you if your Hemoglobin A1C is greater than 7.5. · Ensure/Glucerna  sample, nutritional information, and Ensure/Glucerna coupon given. · Pain pamphlet and Call Don't Fall reminder reviewed with patient. ·  parking is complimentary Monday - Friday 7 am - 5 pm  · Bring PTA Medication list day of surgery with the last doses taken documented     Follow all instructions so your surgery wont be cancelled. Please, be on time. If a situation occurs and you are delayed the day of surgery, call [phone]. If your physical condition changes (like a fever, cold, flu, etc.) call your surgeon. Home medication(s) reviewed and verified via   LIST   during PAT appointment. The patient was contacted by   IN-PERSON  The patient verbalizes understanding of all instructions and    DOES NOT   need reinforcement.

## 2021-04-06 NOTE — H&P
Preoperative Evaluation                     History and Physical with Surgical Risk Stratification     4/6/2021    CC: Neck pain  Surgery: ACDF     HPI:   Bony Larkin is a 27 y.o. female referred for pre-operative evaluation by Dr. [employer] for surgery on 4/13/21. Clinton Reinoso notes she started with right arm pain 6 months ago and it has gotten worse over time. She woke up one morning and felt a \"crick\" in her neck. She is LHD. She has noticed weakness in her right arm. She has mild numbness. Sitting, standing and exercising make her pain worse. Nothing makes the pain better. She is a  for big office buildings so does a lot of pushing and pulling daily. The patient was evaluated in the surgeon's office and it was determined that the most appropriate plan of care is to proceed with surgical intervention. Patient's PCP Juan Bustos MD    Review of Systems     Constitutional: Negative for chills and fever  HENT: Negative for congestion and sore throat  Eyes: negative for blurred vision and double vision  Respiratory: Negative for cough, shortness of breath and wheezing  Mouth: Negative for loose, broken or chipped teeth. Cardiovascular: Negative for chest pain and palpitations  Gastrointestinal: Negative for abdominal pain, nausea, diarrhea & constipation  Genitourinary: Negative for dysuria and hematuria  Musculoskeletal: Neck pain  Skin: Negative for rash, open wounds. Neurological: Negative for dizziness, tremors and headaches  Psychiatric: The patient is not nervous/anxious.     Inherent Risk of Surgery     Surgical risk:  Intermediate  Low:  EIntermediate:   Spinal surgery  Patient Cardiac Risk Assessment     Revised Cardiac Risk Index (RCRI)    Rate if cardiac death, nonfatal MI, nonfatal cardiac arrest by number of risk factor- 0.4%    RAY/AHA 2007 Guidelines:   1) Surgery Emergency, Non-cardiac -> to surgery  2) If not, look at clinical predictors    Intermediate Minor Blood Thinner: NA    METS     >4 METS Climb a flight of stairs or a hill Walk on level ground at 4 mph Run a short distance Heavy work around house (scrub floors, move furniture)     Other Risk Factors:   Screening for ETOH use:  Done and low risk  Smoking status:  Currently     Personal or FH of bleeding problems:  No  Personal or FH of blood clots:  No  Personal or FH of anesthesia problems:   Yes    Pulmonary Risk:  Asthma or COPD:  No  Body mass index is 24.99 kg/m². Known STACY:  No    Past Medical, Surgical, Social History     Allergies: Allergies   Allergen Reactions    Latex Rash, Itching and Swelling     Condoms mostly    Ceclor [Cefaclor] Other (comments)     Ceclor-Hallucinations as child       Medication Documentation Review Audit     Reviewed by Madyson Rivera RN (Registered Nurse) on 21 at 1500    Medication Sig Documenting Provider Last Dose Status Taking? methocarbamoL (Robaxin) 500 mg tablet Take 500 mg by mouth nightly as needed for Muscle Spasm(s). Provider, Historical  Active Yes   trazodone HCl (TRAZODONE PO) Take 5 mg by mouth nightly as needed.  Provider, Historical  Active Yes                Past Medical History:   Diagnosis Date    Anxiety     Insomnia disorder     PONV (postoperative nausea and vomiting)     Slow to wake up after anesthesia      Past Surgical History:   Procedure Laterality Date    HX ABDOMINAL LAPAROSCOPY       Social History     Tobacco Use    Smoking status: Current Every Day Smoker     Packs/day: 0.50     Years: 2.00     Pack years: 1.00     Last attempt to quit: 3/28/2009     Years since quittin.0    Smokeless tobacco: Never Used   Substance Use Topics    Alcohol use: Yes     Comment: occ    Drug use: Never     Family History   Problem Relation Age of Onset    Alcohol abuse Mother     Substance Abuse Mother     Alcohol abuse Father     Diabetes Maternal Grandmother     Heart Disease Maternal Grandmother     Anesth Problems Neg Hx        Objective     Vitals:    04/06/21 1447   BP: 122/67   Pulse: 85   Resp: 17   Temp: 98.1 °F (36.7 °C)   SpO2: 98%   Weight: 64 kg (141 lb 1.5 oz)   Height: 5' 3\" (1.6 m)       Constitutional:  Appears well,  No Acute Distress, Vitals noted  Psychiatric:   Affect normal, Alert and Oriented to person/place/time    Eyes:   Pupils equally round and reactive, EOMI, conjunctiva clear, eyelids normal  ENT:   External ears and nose normal, teeth normal, gums normal, TMs and Orophyarynx normal  Neck:   General inspection and Thyroid normal.  No abnormal cervical or supraclavicular nodes    Lungs:   Clear to auscultation, good respiratory effort  Heart: Ausculation normal.  Regular rhythm. No cardiac murmurs.   No carotid bruits or palpable thrills  Chest wall normal  Musculoskeletal:  equal, normal gait  Extremities:   Without edema, good peripheral pulses  Skin:   Warm to palpation, without rashes, bruising, or suspicious lesions     Recent Results (from the past 72 hour(s))   EKG, 12 LEAD, INITIAL    Collection Time: 04/06/21  2:28 PM   Result Value Ref Range    Ventricular Rate 79 BPM    Atrial Rate 79 BPM    P-R Interval 106 ms    QRS Duration 80 ms    Q-T Interval 384 ms    QTC Calculation (Bezet) 440 ms    Calculated P Axis 10 degrees    Calculated R Axis 41 degrees    Calculated T Axis 19 degrees    Diagnosis       Sinus rhythm with short MD  Otherwise normal ECG  Confirmed by Alec Fish MD, NORY ([id number]) on 4/6/2021 3:16:21 PM     CBC WITH AUTOMATED DIFF    Collection Time: 04/06/21  2:52 PM   Result Value Ref Range    WBC 8.4 3.6 - 11.0 K/uL    RBC 4.64 3.80 - 5.20 M/uL    HGB 13.7 11.5 - 16.0 g/dL    HCT 42.2 35.0 - 47.0 %    MCV 90.9 80.0 - 99.0 FL    MCH 29.5 26.0 - 34.0 PG    MCHC 32.5 30.0 - 36.5 g/dL    RDW 12.5 11.5 - 14.5 %    PLATELET 903 859 - 894 K/uL    MPV 10.8 8.9 - 12.9 FL    NRBC 0.0 0  WBC    ABSOLUTE NRBC 0.00 0.00 - 0.01 K/uL    NEUTROPHILS 60 32 - 75 %    LYMPHOCYTES 29 12 - 49 %    MONOCYTES 6 5 - 13 %    EOSINOPHILS 4 0 - 7 %    BASOPHILS 1 0 - 1 %    IMMATURE GRANULOCYTES 0 0.0 - 0.5 %    ABS. NEUTROPHILS 5.0 1.8 - 8.0 K/UL    ABS. LYMPHOCYTES 2.4 0.8 - 3.5 K/UL    ABS. MONOCYTES 0.5 0.0 - 1.0 K/UL    ABS. EOSINOPHILS 0.4 0.0 - 0.4 K/UL    ABS. BASOPHILS 0.0 0.0 - 0.1 K/UL    ABS. IMM. GRANS. 0.0 0.00 - 0.04 K/UL    DF AUTOMATED     METABOLIC PANEL, COMPREHENSIVE    Collection Time: 04/06/21  2:52 PM   Result Value Ref Range    Sodium 138 136 - 145 mmol/L    Potassium 4.0 3.5 - 5.1 mmol/L    Chloride 105 97 - 108 mmol/L    CO2 27 21 - 32 mmol/L    Anion gap 6 5 - 15 mmol/L    Glucose 85 65 - 100 mg/dL    BUN 14 6 - 20 MG/DL    Creatinine 0.78 0.55 - 1.02 MG/DL    BUN/Creatinine ratio 18 12 - 20      GFR est AA >60 >60 ml/min/1.73m2    GFR est non-AA >60 >60 ml/min/1.73m2    Calcium 9.1 8.5 - 10.1 MG/DL    Bilirubin, total 0.2 0.2 - 1.0 MG/DL    ALT (SGPT) 20 12 - 78 U/L    AST (SGOT) 15 15 - 37 U/L    Alk.  phosphatase 76 45 - 117 U/L    Protein, total 7.0 6.4 - 8.2 g/dL    Albumin 4.1 3.5 - 5.0 g/dL    Globulin 2.9 2.0 - 4.0 g/dL    A-G Ratio 1.4 1.1 - 2.2     HEMOGLOBIN A1C WITH EAG    Collection Time: 04/06/21  2:52 PM   Result Value Ref Range    Hemoglobin A1c 5.7 (H) 4.0 - 5.6 %    Est. average glucose 117 mg/dL   PROTHROMBIN TIME + INR    Collection Time: 04/06/21  2:52 PM   Result Value Ref Range    INR 1.0 0.9 - 1.1      Prothrombin time 10.2 9.0 - 11.1 sec   PTT    Collection Time: 04/06/21  2:52 PM   Result Value Ref Range    aPTT 28.8 22.1 - 31.0 sec    aPTT, therapeutic range     58.0 - 77.0 SECS   URINALYSIS W/ REFLEX CULTURE    Collection Time: 04/06/21  2:52 PM    Specimen: Urine   Result Value Ref Range    Color YELLOW/STRAW      Appearance CLOUDY (A) CLEAR      Specific gravity 1.019 1.003 - 1.030      pH (UA) 7.5 5.0 - 8.0      Protein Negative NEG mg/dL    Glucose Negative NEG mg/dL    Ketone Negative NEG mg/dL    Bilirubin Negative NEG      Blood Negative NEG Urobilinogen 0.2 0.2 - 1.0 EU/dL    Nitrites Negative NEG      Leukocyte Esterase Negative NEG      WBC 0-4 0 - 4 /hpf    RBC 0-5 0 - 5 /hpf    Epithelial cells FEW FEW /lpf    Bacteria Negative NEG /hpf    UA:UC IF INDICATED CULTURE NOT INDICATED BY UA RESULT CNI      Hyaline cast 0-2 0 - 5 /lpf   VITAMIN D, 25 HYDROXY    Collection Time: 04/06/21  2:52 PM   Result Value Ref Range    Vitamin D 25-Hydroxy 15.4 (L) 30 - 100 ng/mL   TYPE & SCREEN    Collection Time: 04/06/21  2:54 PM   Result Value Ref Range    Crossmatch Expiration 04/16/2021,2359     ABO/Rh(D) O POSITIVE     Antibody screen NEG        Assessment and Plan     Assessment/Plan:   1) Cervical Stenosis  2) Pre-Operative Evaluation    Labs and EKG reviewed. MRSA pending    Vitamin D treated    Preoperative Clearance  Per RCRI, the patient has a 0.4% risk of cardiac death, nonfatal MI, nonfatal cardiac arrest based on no risk factors. Per ACC/AHA guidelines, patient is low risk for a(n) intermediate risk surgery and may proceed to planned surgery with the above noted risk.     Rodney Miller NP

## 2021-04-07 LAB
BACTERIA SPEC CULT: ABNORMAL
BACTERIA SPEC CULT: ABNORMAL
EST. AVERAGE GLUCOSE BLD GHB EST-MCNC: 117 MG/DL
HBA1C MFR BLD: 5.7 % (ref 4–5.6)
SERVICE CMNT-IMP: ABNORMAL

## 2021-04-07 RX ORDER — CHOLECALCIFEROL TAB 125 MCG (5000 UNIT) 125 MCG
10000 TAB ORAL DAILY
Qty: 60 TAB | Refills: 0 | Status: SHIPPED | OUTPATIENT
Start: 2021-04-07 | End: 2021-05-07

## 2021-04-08 RX ORDER — MUPIROCIN 20 MG/G
OINTMENT TOPICAL 2 TIMES DAILY
Qty: 22 G | Refills: 0 | Status: SHIPPED | OUTPATIENT
Start: 2021-04-08 | End: 2021-04-13

## 2021-04-09 ENCOUNTER — HOSPITAL ENCOUNTER (OUTPATIENT)
Dept: PREADMISSION TESTING | Age: 31
Discharge: HOME | End: 2021-04-09
Payer: COMMERCIAL

## 2021-04-09 PROCEDURE — U0005 INFEC AGEN DETEC AMPLI PROBE: HCPCS

## 2021-04-09 NOTE — PERIOP NOTES
Discussed diagnostics from PAT appt on 4/6/21 w/LASHANDA Boles, who is unexpectedly out of the office today. All are acceptable w/exception of MRSA & Vit D (which she treated).

## 2021-04-09 NOTE — PERIOP NOTES
Patient notified of low vitamin D level and positive MRSA swab. Medication instructions reviewed with patient and patient verbalized understanding.

## 2021-04-09 NOTE — PROGRESS NOTES
Notification of Positive MRSA and Treatment Ordered by Preadmission Testing Nurse Practitioner      Patient Name:  Chelsy Wilson  MRN: [id number]  : 1990    Surgeon: Stephani Meneses  Date of surgery: 21  Procedure: ACDF    Allergies: Allergies   Allergen Reactions    Latex Rash, Itching and Swelling     Condoms mostly    Ceclor [Cefaclor] Other (comments)     Ceclor-Hallucinations as child       MRSA results: All Micro Results     Procedure Component Value Units Date/Time    MRSA CULTURE NARES [[id number]]  (Abnormal) Collected: 21 5954    Order Status: Completed Specimen: Nasal from Nares Updated: 21 9452     Special Requests: NO SPECIAL REQUESTS        Culture result: MRSA PRESENT               Screening of patient nares for MRSA is for surveillance purposes and, if positive, to facilitate isolation considerations in high risk settings. It is not intended for automatic decolonization interventions per se as regimens are not sufficiently effective to warrant routine use. Treatment ordered: Bactroban ointment 2%- apply intranasal twice daily for 5 days    Pre operative antibiotic will be changed to Vancomycin using weight based dosing (<80kg = 1gm, >80kg = 1.5gm) unless patient has allergy to Vancomycin.     Date patient notified of results / treatment prescribed: 21    The patient was instructed to add medication and date they began treatment to their \"Prior to Admission Medication\" list given to them in [address], [place]

## 2021-04-10 LAB — SARS-COV-2, COV2NT: NOT DETECTED

## 2021-04-16 ENCOUNTER — HOSPITAL ENCOUNTER (OUTPATIENT)
Dept: PREADMISSION TESTING | Age: 31
Discharge: HOME | End: 2021-04-16
Payer: COMMERCIAL

## 2021-04-16 ENCOUNTER — ANESTHESIA EVENT (OUTPATIENT)
Dept: SURGERY | Age: 31
End: 2021-04-16
Payer: COMMERCIAL

## 2021-04-16 PROCEDURE — U0005 INFEC AGEN DETEC AMPLI PROBE: HCPCS

## 2021-04-17 LAB — SARS-COV-2, COV2NT: NOT DETECTED

## 2021-04-19 ENCOUNTER — HOSPITAL ENCOUNTER (OUTPATIENT)
Age: 31
Discharge: HOME | End: 2021-04-20
Attending: ORTHOPAEDIC SURGERY | Admitting: ORTHOPAEDIC SURGERY
Payer: COMMERCIAL

## 2021-04-19 ENCOUNTER — ANESTHESIA (OUTPATIENT)
Dept: SURGERY | Age: 31
End: 2021-04-19
Payer: COMMERCIAL

## 2021-04-19 ENCOUNTER — APPOINTMENT (OUTPATIENT)
Dept: GENERAL RADIOLOGY | Age: 31
End: 2021-04-19
Attending: ORTHOPAEDIC SURGERY
Payer: COMMERCIAL

## 2021-04-19 DIAGNOSIS — G89.18 POST-OP PAIN: Primary | ICD-10-CM

## 2021-04-19 PROBLEM — M47.12 CERVICAL SPONDYLOSIS WITH MYELOPATHY AND RADICULOPATHY: Status: ACTIVE | Noted: 2021-04-19

## 2021-04-19 PROBLEM — M47.22 CERVICAL SPONDYLOSIS WITH MYELOPATHY AND RADICULOPATHY: Status: ACTIVE | Noted: 2021-04-19

## 2021-04-19 LAB — HCG UR QL: NEGATIVE

## 2021-04-19 PROCEDURE — 77030003666 HC NDL SPINAL BD -A: Performed by: ORTHOPAEDIC SURGERY

## 2021-04-19 PROCEDURE — 76010000162 HC OR TIME 1.5 TO 2 HR INTENSV-TIER 1: Performed by: ORTHOPAEDIC SURGERY

## 2021-04-19 PROCEDURE — 81025 URINE PREGNANCY TEST: CPT

## 2021-04-19 PROCEDURE — 77030020061 HC IV BLD WRMR ADMIN SET 3M -B: Performed by: ANESTHESIOLOGY

## 2021-04-19 PROCEDURE — 74011250636 HC RX REV CODE- 250/636: Performed by: ORTHOPAEDIC SURGERY

## 2021-04-19 PROCEDURE — 76060000034 HC ANESTHESIA 1.5 TO 2 HR: Performed by: ORTHOPAEDIC SURGERY

## 2021-04-19 PROCEDURE — 77030034475 HC MISC IMPL SPN: Performed by: ORTHOPAEDIC SURGERY

## 2021-04-19 PROCEDURE — 77030040922 HC BLNKT HYPOTHRM STRY -A

## 2021-04-19 PROCEDURE — 77030040356 HC CORD BPLR FRCP COVD -A: Performed by: ORTHOPAEDIC SURGERY

## 2021-04-19 PROCEDURE — 77030018673: Performed by: ORTHOPAEDIC SURGERY

## 2021-04-19 PROCEDURE — 76210000016 HC OR PH I REC 1 TO 1.5 HR: Performed by: ORTHOPAEDIC SURGERY

## 2021-04-19 PROCEDURE — 77030029099 HC BN WAX SSPC -A: Performed by: ORTHOPAEDIC SURGERY

## 2021-04-19 PROCEDURE — 74011250637 HC RX REV CODE- 250/637: Performed by: ORTHOPAEDIC SURGERY

## 2021-04-19 PROCEDURE — 77030002933 HC SUT MCRYL J&J -A: Performed by: ORTHOPAEDIC SURGERY

## 2021-04-19 PROCEDURE — C1713 ANCHOR/SCREW BN/BN,TIS/BN: HCPCS | Performed by: ORTHOPAEDIC SURGERY

## 2021-04-19 PROCEDURE — 74011250636 HC RX REV CODE- 250/636: Performed by: NURSE PRACTITIONER

## 2021-04-19 PROCEDURE — 74011000250 HC RX REV CODE- 250: Performed by: NURSE ANESTHETIST, CERTIFIED REGISTERED

## 2021-04-19 PROCEDURE — 74011250636 HC RX REV CODE- 250/636: Performed by: ANESTHESIOLOGY

## 2021-04-19 PROCEDURE — 77030031139 HC SUT VCRL2 J&J -A: Performed by: ORTHOPAEDIC SURGERY

## 2021-04-19 PROCEDURE — 77030011267 HC ELECTRD BLD COVD -A: Performed by: ORTHOPAEDIC SURGERY

## 2021-04-19 PROCEDURE — 74011250636 HC RX REV CODE- 250/636: Performed by: NURSE ANESTHETIST, CERTIFIED REGISTERED

## 2021-04-19 PROCEDURE — 77030028271 HC SRGFL HEMSTAT MTRX KT J&J -C: Performed by: ORTHOPAEDIC SURGERY

## 2021-04-19 PROCEDURE — 77030026438 HC STYL ET INTUB CARD -A: Performed by: ANESTHESIOLOGY

## 2021-04-19 PROCEDURE — 77030030102 HC BIT DRL PYRNES K2M -B: Performed by: ORTHOPAEDIC SURGERY

## 2021-04-19 PROCEDURE — 2709999900 HC NON-CHARGEABLE SUPPLY

## 2021-04-19 PROCEDURE — 2709999900 HC NON-CHARGEABLE SUPPLY: Performed by: ORTHOPAEDIC SURGERY

## 2021-04-19 PROCEDURE — 77030012406 HC DRN WND PENRS BARD -A: Performed by: ORTHOPAEDIC SURGERY

## 2021-04-19 PROCEDURE — 77030004391 HC BUR FLUT MEDT -C: Performed by: ORTHOPAEDIC SURGERY

## 2021-04-19 PROCEDURE — 77030038692 HC WND DEB SYS IRMX -B: Performed by: ORTHOPAEDIC SURGERY

## 2021-04-19 PROCEDURE — 74011250636 HC RX REV CODE- 250/636: Performed by: PHYSICIAN ASSISTANT

## 2021-04-19 PROCEDURE — 77030008684 HC TU ET CUF COVD -B: Performed by: ANESTHESIOLOGY

## 2021-04-19 PROCEDURE — 74011000250 HC RX REV CODE- 250: Performed by: ORTHOPAEDIC SURGERY

## 2021-04-19 PROCEDURE — 77030019908 HC STETH ESOPH SIMS -A: Performed by: ANESTHESIOLOGY

## 2021-04-19 PROCEDURE — 77030040361 HC SLV COMPR DVT MDII -B

## 2021-04-19 DEVICE — SCREW SPNL L12MM DIA4MM CERV ST CONSTRN LO PROF TIFIX LCK: Type: IMPLANTABLE DEVICE | Site: SPINE CERVICAL | Status: FUNCTIONAL

## 2021-04-19 DEVICE — SCREW SPNL L14MM DIA4MM CERV ST CONSTRN PYRENEES: Type: IMPLANTABLE DEVICE | Site: SPINE CERVICAL | Status: FUNCTIONAL

## 2021-04-19 DEVICE — PLATE SPNL 1 LEVEL 18 MM ANTR CERV CONSTRN PYRENESS: Type: IMPLANTABLE DEVICE | Site: SPINE CERVICAL | Status: FUNCTIONAL

## 2021-04-19 RX ORDER — ALBUTEROL SULFATE 0.83 MG/ML
2.5 SOLUTION RESPIRATORY (INHALATION) AS NEEDED
Status: DISCONTINUED | OUTPATIENT
Start: 2021-04-19 | End: 2021-04-19 | Stop reason: HOSPADM

## 2021-04-19 RX ORDER — SODIUM CHLORIDE, SODIUM LACTATE, POTASSIUM CHLORIDE, CALCIUM CHLORIDE 600; 310; 30; 20 MG/100ML; MG/100ML; MG/100ML; MG/100ML
125 INJECTION, SOLUTION INTRAVENOUS CONTINUOUS
Status: DISCONTINUED | OUTPATIENT
Start: 2021-04-19 | End: 2021-04-19 | Stop reason: HOSPADM

## 2021-04-19 RX ORDER — SODIUM CHLORIDE, SODIUM LACTATE, POTASSIUM CHLORIDE, CALCIUM CHLORIDE 600; 310; 30; 20 MG/100ML; MG/100ML; MG/100ML; MG/100ML
150 INJECTION, SOLUTION INTRAVENOUS CONTINUOUS
Status: DISCONTINUED | OUTPATIENT
Start: 2021-04-19 | End: 2021-04-19 | Stop reason: HOSPADM

## 2021-04-19 RX ORDER — KETAMINE HYDROCHLORIDE 10 MG/ML
INJECTION, SOLUTION INTRAMUSCULAR; INTRAVENOUS AS NEEDED
Status: DISCONTINUED | OUTPATIENT
Start: 2021-04-19 | End: 2021-04-19 | Stop reason: HOSPADM

## 2021-04-19 RX ORDER — AMOXICILLIN 250 MG
1 CAPSULE ORAL 2 TIMES DAILY
Status: DISCONTINUED | OUTPATIENT
Start: 2021-04-20 | End: 2021-04-20 | Stop reason: HOSPADM

## 2021-04-19 RX ORDER — POLYETHYLENE GLYCOL 3350 17 G/17G
17 POWDER, FOR SOLUTION ORAL DAILY
Status: DISCONTINUED | OUTPATIENT
Start: 2021-04-20 | End: 2021-04-20 | Stop reason: HOSPADM

## 2021-04-19 RX ORDER — CYCLOBENZAPRINE HCL 10 MG
10 TABLET ORAL
Status: DISCONTINUED | OUTPATIENT
Start: 2021-04-19 | End: 2021-04-20 | Stop reason: HOSPADM

## 2021-04-19 RX ORDER — ONDANSETRON 2 MG/ML
4 INJECTION INTRAMUSCULAR; INTRAVENOUS
Status: DISPENSED | OUTPATIENT
Start: 2021-04-19 | End: 2021-04-20

## 2021-04-19 RX ORDER — ROCURONIUM BROMIDE 10 MG/ML
INJECTION, SOLUTION INTRAVENOUS AS NEEDED
Status: DISCONTINUED | OUTPATIENT
Start: 2021-04-19 | End: 2021-04-19 | Stop reason: HOSPADM

## 2021-04-19 RX ORDER — HYDROMORPHONE HCL/0.9% NACL/PF 0.5 MG/ML
PLASTIC BAG, INJECTION (ML) INTRAVENOUS
Status: DISPENSED | OUTPATIENT
Start: 2021-04-19 | End: 2021-04-20

## 2021-04-19 RX ORDER — MIDAZOLAM HYDROCHLORIDE 1 MG/ML
INJECTION, SOLUTION INTRAMUSCULAR; INTRAVENOUS AS NEEDED
Status: DISCONTINUED | OUTPATIENT
Start: 2021-04-19 | End: 2021-04-19 | Stop reason: HOSPADM

## 2021-04-19 RX ORDER — CHOLECALCIFEROL TAB 125 MCG (5000 UNIT) 125 MCG
10000 TAB ORAL DAILY
Status: DISCONTINUED | OUTPATIENT
Start: 2021-04-19 | End: 2021-04-20 | Stop reason: HOSPADM

## 2021-04-19 RX ORDER — OXYCODONE HYDROCHLORIDE 5 MG/1
5 TABLET ORAL
Status: DISCONTINUED | OUTPATIENT
Start: 2021-04-19 | End: 2021-04-20 | Stop reason: HOSPADM

## 2021-04-19 RX ORDER — FAMOTIDINE 20 MG/1
20 TABLET, FILM COATED ORAL 2 TIMES DAILY
Status: DISCONTINUED | OUTPATIENT
Start: 2021-04-19 | End: 2021-04-20 | Stop reason: HOSPADM

## 2021-04-19 RX ORDER — PROCHLORPERAZINE EDISYLATE 5 MG/ML
5 INJECTION INTRAMUSCULAR; INTRAVENOUS
Status: DISCONTINUED | OUTPATIENT
Start: 2021-04-19 | End: 2021-04-20 | Stop reason: HOSPADM

## 2021-04-19 RX ORDER — HYDROMORPHONE HYDROCHLORIDE 2 MG/ML
INJECTION, SOLUTION INTRAMUSCULAR; INTRAVENOUS; SUBCUTANEOUS AS NEEDED
Status: DISCONTINUED | OUTPATIENT
Start: 2021-04-19 | End: 2021-04-19 | Stop reason: HOSPADM

## 2021-04-19 RX ORDER — PROPOFOL 10 MG/ML
INJECTION, EMULSION INTRAVENOUS AS NEEDED
Status: DISCONTINUED | OUTPATIENT
Start: 2021-04-19 | End: 2021-04-19 | Stop reason: HOSPADM

## 2021-04-19 RX ORDER — SODIUM CHLORIDE, SODIUM LACTATE, POTASSIUM CHLORIDE, CALCIUM CHLORIDE 600; 310; 30; 20 MG/100ML; MG/100ML; MG/100ML; MG/100ML
INJECTION, SOLUTION INTRAVENOUS
Status: DISCONTINUED | OUTPATIENT
Start: 2021-04-19 | End: 2021-04-19 | Stop reason: HOSPADM

## 2021-04-19 RX ORDER — ACETAMINOPHEN 325 MG/1
650 TABLET ORAL
Status: DISCONTINUED | OUTPATIENT
Start: 2021-04-19 | End: 2021-04-20 | Stop reason: HOSPADM

## 2021-04-19 RX ORDER — NALOXONE HYDROCHLORIDE 0.4 MG/ML
0.4 INJECTION, SOLUTION INTRAMUSCULAR; INTRAVENOUS; SUBCUTANEOUS AS NEEDED
Status: DISCONTINUED | OUTPATIENT
Start: 2021-04-19 | End: 2021-04-20 | Stop reason: HOSPADM

## 2021-04-19 RX ORDER — SODIUM CHLORIDE 0.9 % (FLUSH) 0.9 %
5-40 SYRINGE (ML) INJECTION EVERY 8 HOURS
Status: DISCONTINUED | OUTPATIENT
Start: 2021-04-19 | End: 2021-04-20 | Stop reason: HOSPADM

## 2021-04-19 RX ORDER — ONDANSETRON 2 MG/ML
INJECTION INTRAMUSCULAR; INTRAVENOUS AS NEEDED
Status: DISCONTINUED | OUTPATIENT
Start: 2021-04-19 | End: 2021-04-19 | Stop reason: HOSPADM

## 2021-04-19 RX ORDER — GLYCOPYRROLATE 0.2 MG/ML
INJECTION INTRAMUSCULAR; INTRAVENOUS AS NEEDED
Status: DISCONTINUED | OUTPATIENT
Start: 2021-04-19 | End: 2021-04-19 | Stop reason: HOSPADM

## 2021-04-19 RX ORDER — DIPHENHYDRAMINE HYDROCHLORIDE 50 MG/ML
12.5 INJECTION, SOLUTION INTRAMUSCULAR; INTRAVENOUS
Status: DISCONTINUED | OUTPATIENT
Start: 2021-04-19 | End: 2021-04-20 | Stop reason: HOSPADM

## 2021-04-19 RX ORDER — SODIUM CHLORIDE 0.9 % (FLUSH) 0.9 %
5-40 SYRINGE (ML) INJECTION AS NEEDED
Status: DISCONTINUED | OUTPATIENT
Start: 2021-04-19 | End: 2021-04-20 | Stop reason: HOSPADM

## 2021-04-19 RX ORDER — DIPHENHYDRAMINE HYDROCHLORIDE 50 MG/ML
12.5 INJECTION, SOLUTION INTRAMUSCULAR; INTRAVENOUS AS NEEDED
Status: DISCONTINUED | OUTPATIENT
Start: 2021-04-19 | End: 2021-04-19 | Stop reason: HOSPADM

## 2021-04-19 RX ORDER — LIDOCAINE HYDROCHLORIDE 10 MG/ML
0.1 INJECTION, SOLUTION EPIDURAL; INFILTRATION; INTRACAUDAL; PERINEURAL AS NEEDED
Status: DISCONTINUED | OUTPATIENT
Start: 2021-04-19 | End: 2021-04-19 | Stop reason: HOSPADM

## 2021-04-19 RX ORDER — ONDANSETRON 2 MG/ML
4 INJECTION INTRAMUSCULAR; INTRAVENOUS AS NEEDED
Status: DISCONTINUED | OUTPATIENT
Start: 2021-04-19 | End: 2021-04-20 | Stop reason: HOSPADM

## 2021-04-19 RX ORDER — OXYCODONE HYDROCHLORIDE 5 MG/1
10 TABLET ORAL
Status: DISCONTINUED | OUTPATIENT
Start: 2021-04-19 | End: 2021-04-20 | Stop reason: HOSPADM

## 2021-04-19 RX ORDER — FACIAL-BODY WIPES
10 EACH TOPICAL DAILY PRN
Status: DISCONTINUED | OUTPATIENT
Start: 2021-04-21 | End: 2021-04-20 | Stop reason: HOSPADM

## 2021-04-19 RX ORDER — DEXAMETHASONE SODIUM PHOSPHATE 4 MG/ML
INJECTION, SOLUTION INTRA-ARTICULAR; INTRALESIONAL; INTRAMUSCULAR; INTRAVENOUS; SOFT TISSUE AS NEEDED
Status: DISCONTINUED | OUTPATIENT
Start: 2021-04-19 | End: 2021-04-19 | Stop reason: HOSPADM

## 2021-04-19 RX ORDER — PROCHLORPERAZINE MALEATE 5 MG
5 TABLET ORAL
Status: DISCONTINUED | OUTPATIENT
Start: 2021-04-19 | End: 2021-04-19

## 2021-04-19 RX ORDER — NEOSTIGMINE METHYLSULFATE 1 MG/ML
INJECTION, SOLUTION INTRAVENOUS AS NEEDED
Status: DISCONTINUED | OUTPATIENT
Start: 2021-04-19 | End: 2021-04-19 | Stop reason: HOSPADM

## 2021-04-19 RX ORDER — PROPOFOL 10 MG/ML
INJECTION, EMULSION INTRAVENOUS
Status: DISCONTINUED | OUTPATIENT
Start: 2021-04-19 | End: 2021-04-19 | Stop reason: HOSPADM

## 2021-04-19 RX ORDER — LIDOCAINE HYDROCHLORIDE 20 MG/ML
INJECTION, SOLUTION EPIDURAL; INFILTRATION; INTRACAUDAL; PERINEURAL AS NEEDED
Status: DISCONTINUED | OUTPATIENT
Start: 2021-04-19 | End: 2021-04-19 | Stop reason: HOSPADM

## 2021-04-19 RX ORDER — HYDROMORPHONE HYDROCHLORIDE 1 MG/ML
0.5 INJECTION, SOLUTION INTRAMUSCULAR; INTRAVENOUS; SUBCUTANEOUS
Status: DISCONTINUED | OUTPATIENT
Start: 2021-04-19 | End: 2021-04-19 | Stop reason: HOSPADM

## 2021-04-19 RX ORDER — HYDROMORPHONE HYDROCHLORIDE 1 MG/ML
0.5 INJECTION, SOLUTION INTRAMUSCULAR; INTRAVENOUS; SUBCUTANEOUS
Status: ACTIVE | OUTPATIENT
Start: 2021-04-19 | End: 2021-04-20

## 2021-04-19 RX ORDER — SODIUM CHLORIDE 9 MG/ML
125 INJECTION, SOLUTION INTRAVENOUS CONTINUOUS
Status: DISPENSED | OUTPATIENT
Start: 2021-04-19 | End: 2021-04-20

## 2021-04-19 RX ADMIN — HYDROMORPHONE HYDROCHLORIDE 2 MG: 2 INJECTION INTRAMUSCULAR; INTRAVENOUS; SUBCUTANEOUS at 07:26

## 2021-04-19 RX ADMIN — Medication 2 MG: at 08:51

## 2021-04-19 RX ADMIN — ONDANSETRON 4 MG: 2 INJECTION INTRAMUSCULAR; INTRAVENOUS at 15:22

## 2021-04-19 RX ADMIN — PROPOFOL 100 MCG/KG/MIN: 10 INJECTION, EMULSION INTRAVENOUS at 07:34

## 2021-04-19 RX ADMIN — ONDANSETRON HYDROCHLORIDE 4 MG: 2 SOLUTION INTRAMUSCULAR; INTRAVENOUS at 07:48

## 2021-04-19 RX ADMIN — SODIUM CHLORIDE 125 ML/HR: 9 INJECTION, SOLUTION INTRAVENOUS at 20:52

## 2021-04-19 RX ADMIN — SODIUM CHLORIDE, POTASSIUM CHLORIDE, SODIUM LACTATE AND CALCIUM CHLORIDE 150 ML/HR: 600; 310; 30; 20 INJECTION, SOLUTION INTRAVENOUS at 06:39

## 2021-04-19 RX ADMIN — PROPOFOL 50 MG: 10 INJECTION, EMULSION INTRAVENOUS at 08:48

## 2021-04-19 RX ADMIN — LIDOCAINE HYDROCHLORIDE 60 MG: 20 INJECTION, SOLUTION EPIDURAL; INFILTRATION; INTRACAUDAL; PERINEURAL at 07:34

## 2021-04-19 RX ADMIN — GLYCOPYRROLATE 0.1 MG: 0.2 INJECTION INTRAMUSCULAR; INTRAVENOUS at 07:48

## 2021-04-19 RX ADMIN — FAMOTIDINE 20 MG: 20 TABLET, FILM COATED ORAL at 13:07

## 2021-04-19 RX ADMIN — CHOLECALCIFEROL TAB 125 MCG (5000 UNIT) 10000 UNITS: 125 TAB at 13:07

## 2021-04-19 RX ADMIN — MIDAZOLAM HYDROCHLORIDE 2 MG: 2 INJECTION, SOLUTION INTRAMUSCULAR; INTRAVENOUS at 07:26

## 2021-04-19 RX ADMIN — Medication 10 ML: at 15:22

## 2021-04-19 RX ADMIN — SODIUM CHLORIDE, POTASSIUM CHLORIDE, SODIUM LACTATE AND CALCIUM CHLORIDE: 600; 310; 30; 20 INJECTION, SOLUTION INTRAVENOUS at 07:51

## 2021-04-19 RX ADMIN — PROPOFOL 50 MG: 10 INJECTION, EMULSION INTRAVENOUS at 08:11

## 2021-04-19 RX ADMIN — FAMOTIDINE 20 MG: 20 TABLET, FILM COATED ORAL at 18:15

## 2021-04-19 RX ADMIN — VANCOMYCIN HYDROCHLORIDE 1000 MG: 1 INJECTION, POWDER, LYOPHILIZED, FOR SOLUTION INTRAVENOUS at 07:52

## 2021-04-19 RX ADMIN — Medication 10 ML: at 20:58

## 2021-04-19 RX ADMIN — PROCHLORPERAZINE EDISYLATE 5 MG: 5 INJECTION INTRAMUSCULAR; INTRAVENOUS at 18:15

## 2021-04-19 RX ADMIN — VANCOMYCIN HYDROCHLORIDE 1000 MG: 1 INJECTION, POWDER, LYOPHILIZED, FOR SOLUTION INTRAVENOUS at 20:52

## 2021-04-19 RX ADMIN — KETAMINE HYDROCHLORIDE 30 MG: 10 INJECTION INTRAMUSCULAR; INTRAVENOUS at 07:34

## 2021-04-19 RX ADMIN — PROPOFOL 120 MG: 10 INJECTION, EMULSION INTRAVENOUS at 07:34

## 2021-04-19 RX ADMIN — Medication: at 09:59

## 2021-04-19 RX ADMIN — PROPOFOL 30 MG: 10 INJECTION, EMULSION INTRAVENOUS at 07:52

## 2021-04-19 RX ADMIN — GLYCOPYRROLATE 0.3 MG: 0.2 INJECTION INTRAMUSCULAR; INTRAVENOUS at 08:51

## 2021-04-19 RX ADMIN — DEXAMETHASONE SODIUM PHOSPHATE 8 MG: 4 INJECTION, SOLUTION INTRAMUSCULAR; INTRAVENOUS at 07:48

## 2021-04-19 RX ADMIN — ROCURONIUM BROMIDE 30 MG: 10 INJECTION INTRAVENOUS at 07:34

## 2021-04-19 RX ADMIN — Medication: at 19:47

## 2021-04-19 RX ADMIN — SODIUM CHLORIDE 125 ML/HR: 9 INJECTION, SOLUTION INTRAVENOUS at 09:42

## 2021-04-19 NOTE — OP NOTES
[address], [address] [facility] [address]    OPERATIVE REPORT      NAME: Miles Biswas    AGE: 27 y.o. YOB: 1990    MEDICAL RECORD NUMBER: [id number]    DATE OF SURGERY: 4/19/2021    OPERATIVE REPORT     PREOPERATIVE DIAGNOSIS: Cervical stenosis     POSTOPERATIVE DIAGNOSIS: Cervical stenosis    OPERATIVE PROCEDURE: C5 to C6 anterior cervical diskectomy and fusion with instrumentation and application of interbody spacer at C5-C6    SURGEON: Pedro Bowers MD     ASSISTANT: KAREN Rodriguez     ANESTHESIA: General    COMPLICATIONS: None    ESTIMATED BLOOD LOSS: 50 cc    INSTRUMENTATION: K2M plate, Seaspine spacer    Specimens - no    INDICATION FOR PROCEDURE: The patient is a very pleasant 27 y.o. female with cervical stenosis. The patient elected to proceed with operative intervention. She was aware of the risks, benefits, and alternatives. She was provided informed consent. PROCEDURE: The patient was identified in the preoperative holding area. The anterior cervical spine was marked by me. She was transferred to the operating room where general anesthesia was given. She was also given perioperative ancef antibiotics. The patient was placed supine on the operating room table. All bony prominences were well-padded. The shoulders were taped. The anterior cervical spine was prepped and draped in the usual standard fashion. We performed a surgical time-out. I made a skin incision on the left side. It was transverse. I exposed the anterior cervical spine. I placed a needle into the disk space to verify our level. I exposed the disc space with electrocautery from uncus to uncus. I brought in the operating room microscope. I performed a diskectomy at C5-C6. I decompressed the spinal cord and nerve roots bilaterally. I prepared the endplates to bleeding bone. We had good hemostasis. I performed trial sizing.  I placed a spacer into C5-C6 with the appropriate amount of tension and alignment. The spacer had allograft. I then placed an anterior cervical plate into C5 and C6. The screws were locked to the plate according to the manufacture's specification. The instrumentation is a standalone device and is separate instrumentation from the device anchoring for the interbody biomechanical device placed into the discectomy defect for purposes of a spinal fusion. We had good hemostasis. I copiously irrigated the entire wound. I placed a deep drain. The wound was closed with 3-0 Vicryl and 4-0 Monocryl. A sterile dressing was applied. The patient was extubated and transferred to the recovery room in good medical condition. The PA assisted with retraction and closure. I, Dr. Giuseppe Reyna, performed the above procedures.      Giuseppe Reyna MD  4/19/2021

## 2021-04-19 NOTE — H&P
Date of Surgery Update:  Catherine Osuna was seen and examined. History and physical has been reviewed. The patient has been examined.  There have been no significant clinical changes since the completion of the originally dated History and Physical.    Signed By: Kate Lindo MD     April 19, 2021 7:23 AM

## 2021-04-19 NOTE — ANESTHESIA PREPROCEDURE EVALUATION
Relevant Problems   No relevant active problems       Anesthetic History     PONV     Comments: Slow to wake     Review of Systems / Medical History  Patient summary reviewed and pertinent labs reviewed    Pulmonary          Smoker         Neuro/Psych         Psychiatric history     Cardiovascular                  Exercise tolerance: >4 METS     GI/Hepatic/Renal  Within defined limits              Endo/Other  Within defined limits           Other Findings              Physical Exam    Airway  Mallampati: II  TM Distance: 4 - 6 cm  Neck ROM: normal range of motion   Mouth opening: Normal     Cardiovascular  Regular rate and rhythm,  S1 and S2 normal,  no murmur, click, rub, or gallop  Rhythm: regular  Rate: normal         Dental  No notable dental hx       Pulmonary  Breath sounds clear to auscultation               Abdominal  GI exam deferred       Other Findings            Anesthetic Plan    ASA: 2  Anesthesia type: general          Induction: Intravenous  Anesthetic plan and risks discussed with: Patient

## 2021-04-19 NOTE — PERIOP NOTES
Report received from [employer], pt care assumed at this time. 1000 - Pt waking, denies pain, BUE strength strong, no deficits, moving all limbs spontaneously, SCDs in place, PCA started, IVF running, preparing for move to floor, will update spouse on room assignment and move. Naheed 44 with spouse Jimena Benito. 1015 - TRANSFER - OUT REPORT:    Verbal report given to Melania(name) on [employer]  being transferred to [facility](unit) for routine post - op       Report consisted of patients Situation, Background, Assessment and   Recommendations(SBAR). Information from the following report(s) OR Summary and MAR was reviewed with the receiving nurse. Lines:   Peripheral IV 04/19/21 Right Wrist (Active)   Site Assessment Clean, dry, & intact 04/19/21 0945   Phlebitis Assessment 0 04/19/21 0945   Infiltration Assessment 0 04/19/21 0945   Dressing Status Clean, dry, & intact 04/19/21 0945   Dressing Type Transparent 04/19/21 0945   Hub Color/Line Status Pink 04/19/21 0945   Alcohol Cap Used Yes 04/19/21 0945       Peripheral IV 04/19/21 Left Hand (Active)   Site Assessment Clean, dry, & intact 04/19/21 0945   Phlebitis Assessment 0 04/19/21 0945   Infiltration Assessment 0 04/19/21 0945   Dressing Status Clean, dry, & intact 04/19/21 0945   Dressing Type Transparent 04/19/21 0945   Hub Color/Line Status Pink 04/19/21 0945   Alcohol Cap Used Yes 04/19/21 0945        Opportunity for questions and clarification was provided. Patient transported with:   Registered Nurse      [address]

## 2021-04-19 NOTE — PROGRESS NOTES
4/19/2021  12:26 PM  Reason for Admission: Elective - Cervical disc displacement. Surgery required. Assessment:   [x]In person with pt   []Via p/c with pt   []With family member in person. Who/Relation:     []With family member via p/c. Who/Relation:   []Chart Review    RUR:  NA - OBS  Risk Level: [x]Low []Moderate []High  Value-based purchasing: [] Yes [x] No  Bundle patient: [] Yes [x] No   Specify:     Advance Directive: Prior. Full Code. [x] No AD on file. [] AD on file. [] Current AD not on file. Copy requested. [] Requests AD, and referral submitted to [facility]. Healthcare Decision Maker: José Causey /  - [phone]        Assessment:    Age: 27    Sex: [] Male [x]Female     Residency: [x]Private residence []Apartment []Assisted Living []LTC []Other:   Exterior Steps: 2  Interior Steps: 0    Lives With: [x]With spouse []Other family members []Underage children []Alone []Care provider []Other:    Prior functioning:  [x]Independent with ADLs and iADLS []Dependent with ADLs and iADLs []Partial dependence, Specify:     Prior DME required:  [x]None []RW []Cane []Crutches []Bedside commode []CPAP []Home O2 (Liter/Provider: ) []Nebulizer   []Shower Chair []Wheelchair []Hospital Bed []Kim []Stair lift []Rollator []Other:    DME available: [x]None []RW []Cane []Crutches []Bedside commode []CPAP []Home O2 (Liter/Provider: ) []Nebulizer   []Shower Chair []Wheelchair []Hospital Bed []Kim []Stair lift []Rollator []Other:    Rehab history: [x]None []Outpatient PT []Home Health (Provider/Date: ) []SNF (Provider/Date: ) []IPR (Provider/Date: ) []LTC (Provider/Date: ) []Hospice (Provider/Date: )  []Other:     Discharge Concerns: []Yes [x]No []Unknown   Describe:    Comments:      Insurer:   Insurance Information                [employer]BSH[employer] P[employer] Phone:     Subscriber: Kuldip Campos Subscriber#: [id number]    Group#: [id number] Precert#:         [facility] MEDICAID/[place] BERN[place] [facility] Phone:     Subscriber: Kuldip Campos Subscriber#: [id number]    Group#: [id number] Precert#:       Observation notice provided in writing to patient and/or caregiver as well as verbal explanation of the policy. Patients who are in outpatient status also receive the Observation notice. Pt did not sign. PCP: Xiao Parkinson   Address: [address] [facility] [address] [place] [address]   Phone number: [phone]   Current patient: [x]Yes []No   Approximate date of last visit: 2 months ago   Access to virtual PCP visits: []Yes [x]No    Pharmacy:  [place] Transport: Family       Transition of care plan:    []Unable to determine at this time. Awaiting clinical progress, and disposition recommendations. [x] Home with outpatient follow-up    [] Home with Outpatient PT and outpatient follow-up   Pt aware of OP appt? []Yes, Provider:   []Not scheduled   Transport provider:     [] Home with family assistance as needed and outpatient follow-up   Family able to assist:    Schedule:  Transport provider:      [] Home with Home Health   - Provider:     []SNF/IPR   -[]Preferences given:   []Listing provided and preferences requested   -Status: []Pending []Accepted:    -Auth required: []Yes []No    -Auth initiated date:   -3 midnight stay required: []Yes []No  Date satisfied:     [] Home with Hospice   -Provider:     [] [facility] information provided. [] Other:     Belen Gonzales MA    Care Management Interventions  PCP Verified by CM: Yes(Lizet)  Mode of Transport at Discharge:  Other (see comment)(Family)  MyChart Signup: No  Discharge Durable Medical Equipment: No  Physical Therapy Consult: No  Occupational Therapy Consult: No  Speech Therapy Consult: No  Current Support Network: Lives with Spouse  Confirm Follow Up Transport: Family  Discharge Location  Discharge Placement: Home with family assistance

## 2021-04-19 NOTE — ANESTHESIA POSTPROCEDURE EVALUATION
Procedure(s):  C5-C6  ANTERIOR CERVICAL DISCECTOMY WITH FUSION WITH INSTRUMENTATION. general    Anesthesia Post Evaluation      Multimodal analgesia: multimodal analgesia not used between 6 hours prior to anesthesia start to PACU discharge  Patient location during evaluation: PACU  Patient participation: complete - patient participated  Level of consciousness: awake  Pain management: adequate  Airway patency: patent  Anesthetic complications: no  Cardiovascular status: acceptable, blood pressure returned to baseline and hemodynamically stable  Respiratory status: acceptable  Hydration status: acceptable  Post anesthesia nausea and vomiting:  controlled      INITIAL Post-op Vital signs:   Vitals Value Taken Time   /88 04/19/21 1000   Temp 36.1 °C (97 °F) 04/19/21 0918   Pulse 73 04/19/21 1003   Resp 12 04/19/21 1003   SpO2 100 % 04/19/21 1003   Vitals shown include unvalidated device data.

## 2021-04-20 VITALS
SYSTOLIC BLOOD PRESSURE: 130 MMHG | DIASTOLIC BLOOD PRESSURE: 83 MMHG | WEIGHT: 141.09 LBS | TEMPERATURE: 98.6 F | HEART RATE: 90 BPM | BODY MASS INDEX: 25 KG/M2 | OXYGEN SATURATION: 100 % | RESPIRATION RATE: 17 BRPM | HEIGHT: 63 IN

## 2021-04-20 LAB
ANION GAP SERPL CALC-SCNC: 2 MMOL/L (ref 5–15)
BUN SERPL-MCNC: 8 MG/DL (ref 6–20)
BUN/CREAT SERPL: 11 (ref 12–20)
CALCIUM SERPL-MCNC: 9.3 MG/DL (ref 8.5–10.1)
CHLORIDE SERPL-SCNC: 107 MMOL/L (ref 97–108)
CO2 SERPL-SCNC: 28 MMOL/L (ref 21–32)
CREAT SERPL-MCNC: 0.7 MG/DL (ref 0.55–1.02)
GLUCOSE SERPL-MCNC: 124 MG/DL (ref 65–100)
HGB BLD-MCNC: 13.1 G/DL (ref 11.5–16)
POTASSIUM SERPL-SCNC: 4 MMOL/L (ref 3.5–5.1)
SODIUM SERPL-SCNC: 137 MMOL/L (ref 136–145)

## 2021-04-20 PROCEDURE — 36415 COLL VENOUS BLD VENIPUNCTURE: CPT

## 2021-04-20 PROCEDURE — 74011250636 HC RX REV CODE- 250/636: Performed by: ORTHOPAEDIC SURGERY

## 2021-04-20 PROCEDURE — 74011250637 HC RX REV CODE- 250/637: Performed by: ORTHOPAEDIC SURGERY

## 2021-04-20 PROCEDURE — 80048 BASIC METABOLIC PNL TOTAL CA: CPT

## 2021-04-20 PROCEDURE — 85018 HEMOGLOBIN: CPT

## 2021-04-20 PROCEDURE — 77030027138 HC INCENT SPIROMETER -A

## 2021-04-20 RX ORDER — OXYCODONE HYDROCHLORIDE 5 MG/1
5-10 TABLET ORAL
Qty: 50 TAB | Refills: 0 | Status: SHIPPED | OUTPATIENT
Start: 2021-04-20 | End: 2021-04-27

## 2021-04-20 RX ORDER — DOCUSATE SODIUM 100 MG/1
100 CAPSULE, LIQUID FILLED ORAL 2 TIMES DAILY
Qty: 60 CAP | Refills: 0 | Status: SHIPPED | OUTPATIENT
Start: 2021-04-20

## 2021-04-20 RX ORDER — CYCLOBENZAPRINE HCL 10 MG
10 TABLET ORAL
Qty: 30 TAB | Refills: 0 | Status: SHIPPED | OUTPATIENT
Start: 2021-04-20

## 2021-04-20 RX ADMIN — POLYETHYLENE GLYCOL 3350 17 G: 17 POWDER, FOR SOLUTION ORAL at 08:15

## 2021-04-20 RX ADMIN — CHOLECALCIFEROL TAB 125 MCG (5000 UNIT) 10000 UNITS: 125 TAB at 08:15

## 2021-04-20 RX ADMIN — Medication 10 ML: at 05:55

## 2021-04-20 RX ADMIN — FAMOTIDINE 20 MG: 20 TABLET, FILM COATED ORAL at 08:15

## 2021-04-20 RX ADMIN — DOCUSATE SODIUM 50MG AND SENNOSIDES 8.6MG 1 TABLET: 8.6; 5 TABLET, FILM COATED ORAL at 08:15

## 2021-04-20 RX ADMIN — OXYCODONE HYDROCHLORIDE 5 MG: 5 TABLET ORAL at 05:55

## 2021-04-20 RX ADMIN — VANCOMYCIN HYDROCHLORIDE 1000 MG: 1 INJECTION, POWDER, LYOPHILIZED, FOR SOLUTION INTRAVENOUS at 08:15

## 2021-04-20 NOTE — PROGRESS NOTES
4/20/2021  9:01 AM  RUR:  NA - OBS  Risk Level: [x]Low []Moderate []High  Value-based purchasing: [] Yes [x] No  Bundle patient: [] Yes [x] No   Specify:     Transition of care plan:  1. Discharge order submitted. Pt has medically cleared. 2. Home with family assistance as needed. 3. Outpatient follow-up. 4. Pt's family to transport. Care Management Interventions  PCP Verified by CM: Yes(Lizet)  Mode of Transport at Discharge:  Other (see comment)(Family)  MyChart Signup: No  Discharge Durable Medical Equipment: No  Physical Therapy Consult: No  Occupational Therapy Consult: No  Speech Therapy Consult: No  Current Support Network: Lives with Spouse  Confirm Follow Up Transport: Family  Discharge Location  Discharge Placement: Home with family assistance

## 2021-04-20 NOTE — PROGRESS NOTES
Medication E-scribed to her pharmacy.  at her side. Nurse handed patient a copy of instructions . All instructions and medications were read and explained to her. Verbalized understanding. Awaiting completion of IV antbiotics before discharge.

## 2021-04-20 NOTE — DISCHARGE INSTRUCTIONS
Kate Lindo MD  [address] [facility]  Office Phone: [phone]  Neck Surgery Discharge Instructions  Activities   You are going home a well person, be as active as possible. Your only exercise should be walking. Start with short frequent walks and increase your walking distance each day. Start with walking twice a day for 5 minutes and increase your distance each day 2-3 minutes until you reach 25 minutes twice a day. Limit the amount of time you sit to 20-30 minute intervals. Sitting for prolonged periods of time will be uncomfortable for you following your surgery.  Do not lift anything over five pounds, and do not do any bending or straining.  Avoid reaching overhead in this post-operative period   Do not do any neck exercises until you have been instructed by your doctor.  When you are in the bed, you may lay on your back or on either side. Do not lie on your stomach.  Continue using your incentive spirometer regularly for deep breathing exercises   You may resume sexual relations 3-4 weeks after your surgery, depending on how you are feeling. Diet   You may resume your normal diet. If your throat is sore, you may want to eat soft foods for a few days. Be sure to drink plenty of fluids, it is important to keep yourself hydrated. If you begin having trouble swallowing, call the office immediately.  Avoid alcoholic beverages and ABSOLUTELY NO tobacco products. Tobacco products will interfere with your healing. If you continue to use tobacco, you may end up needing another surgery in the future. Medications   Do not take anti-inflammatory medications or aspirin unless instructed by your physician.  Take your pain medication as directed.  Do NOT take additional Tylenol if your prescribed pain medication has acetaminophen in it (Endocet/Percocet, Lortab, Norco).  It is important to have regular bowel movements. Pain medications may cause constipation.   Stool softeners, prune juice, and increasing your water and fiber intake may help in preventing constipation.  Do NOT take laxatives if at all possible except in severe situations. It can results in a vicious cycle of constipation and diarrhea.  Do not be alarmed if you still have some of the same symptoms you had prior to surgery. The nerves often require time to heal after the pressure has been relieved. You may experience pain in your shoulders or between your shoulder blades, which is common after this surgery. The level of pain you experience should improve as your body heals.  **You may resume your home robaxin and trazodone after you are done taking the flexeril and oxycodone. **    *** VERY IMPORTANT - PLEASE READ*** Please monitor the supply of your pain medicine closely and if it looks like you're going to run out of pain medicine over the weekend please call the office on [address] prior to the weekend to request a refill. The on call physician for nights and weekends CANNOT refill pain medicine. You will either have to wait until Monday morning when the office re-opens or you will have to go to an urgent care/ emergency room if you are in need of pain medicine. Driving   You may not drive or return to work until instructed by your physician. However, you may ride in the car for short periods of time. Neck collar   Wear your neck brace. You may remove it for short breaks, when eating or showering. You must keep the brace on while sleeping and when ambulating. Showering   You may shower in approximately 5 days after your surgery if your incision is not draining.  You may remove your brace during showers.  Do not rub or apply lotion or ointments to the incision site.  Do not use tub baths, swimming pools or Jacuzzis. Caring for your incision   Keep the clear, plastic dressing on until 3 days after surgery.  At that point, if the incision is dry and without drainage, you may keep the wound open to air without cover.  You may have steri-strips on your incision (small, white pieces of tape). Do not pull the steri-strips; they will fall off on their own after several days. If you have sutures or staples, they will be removed by home health or when you see your physician.  Do not rub or apply any lotions or ointments to your incision site. Follow Up   Once you are home, call your physicians office to schedule an appointment 2-3 weeks after surgery. Notify your physician if you develop any of the following conditions:   Fever above 101 degrees for 24 hours.  Nausea or vomiting.  Severe headache.  Inability to urinate.  Loss of bowel or bladder control (sudden onset of incontinence).  Changes in sensation in your extremities (numbness, tingling, loss of color).  Severe pain or pain not relieved by medications.  Redness, swelling, or drainage from your incision.  Persistent pain in the chest.    Pain in the calf of either leg.  Increased weakness (if this is greater than before your surgery). If you have any questions, contact your Orthopaedic Surgeons office. OFFICE OF DR. Sarahi Toth   [phone]  OUR [place] [address] [place] [address], [address], [address] [facility], [address] [facility] [address]     * WEAR YOUR BRACE AS ADVISED    * NO DRIVING UNTIL YOU ARE CLEARED TO DO SO BY YOUR SURGEON    * LIMIT LIFTING, BENDING AND TWISTING.  NO LIFTING MORE THAN 5 LBS    * MAKE SURE YOU ARE GETTING GOOD NUTRITION (Lean Protein, Vitamin D AND Calcium)    * DO NOT TAKE ANY NSAIDs FOR THE FIRST 3 MONTHS AFTER SURGERY (such as Advil/Ibuprofen/Motrin, Aleve/Naproxen/Naprosyn, Diclofenac, Celebrex, Meloxicam, Indomethacin, Goody's powder, BC powder etc.)    * NO NICOTINE PRODUCTS    * FULLY READ YOUR DISCHARGE INSTRUCTIONS

## 2021-04-20 NOTE — PROGRESS NOTES
Problem: Risk for Spread of Infection  Goal: Prevent transmission of infectious organism to others  Description: Prevent the transmission of infectious organisms to other patients, staff members, and visitors. Outcome: Progressing Towards Goal     Problem: Patient Education:  Go to Education Activity  Goal: Patient/Family Education  Outcome: Progressing Towards Goal     Problem: Falls - Risk of  Goal: *Absence of Falls  Description: Document Desmond Bell Fall Risk and appropriate interventions in the flowsheet.   Outcome: Progressing Towards Goal  Note: Fall Risk Interventions:                                Problem: Patient Education: Go to Patient Education Activity  Goal: Patient/Family Education  Outcome: Progressing Towards Goal     Problem: Simple Spine Procedure:  Day of Surgery  Goal: Off Pathway (Use only if patient is Off Pathway)  Outcome: Progressing Towards Goal  Goal: Activity/Safety  Outcome: Progressing Towards Goal  Goal: Consults, if ordered  Outcome: Progressing Towards Goal  Goal: Nutrition/Diet  Outcome: Progressing Towards Goal  Goal: Discharge Planning  Outcome: Progressing Towards Goal  Goal: Medications  Outcome: Progressing Towards Goal  Goal: Respiratory  Outcome: Progressing Towards Goal  Goal: Treatments/Interventions/Procedures  Outcome: Progressing Towards Goal  Goal: Psychosocial  Outcome: Progressing Towards Goal  Goal: *Verbalizes understanding of type and use of pain medication  Outcome: Progressing Towards Goal  Goal: *Optimal pain control at patient's stated goal  Outcome: Progressing Towards Goal  Goal: *Verbalizes/demonstrates understanding of proper body mechanics and use of stabilization device if ordered  Outcome: Progressing Towards Goal  Goal: *Activity level attained as ordered  Outcome: Progressing Towards Goal  Goal: *Active bowel sounds  Outcome: Progressing Towards Goal  Goal: *Respiratory status stable  Outcome: Progressing Towards Goal  Goal: *Adequate urinary output  Outcome: Progressing Towards Goal  Goal: *Hemodynamically stable  Outcome: Progressing Towards Goal     Problem: Simple Spine Procedure:  Post Op Day 1/Day of Discharge  Goal: Off Pathway (Use only if patient is Off Pathway)  Outcome: Progressing Towards Goal  Goal: Activity/Safety  Outcome: Progressing Towards Goal  Goal: Nutrition/Diet  Outcome: Progressing Towards Goal  Goal: Discharge Planning  Outcome: Progressing Towards Goal  Goal: Medications  Outcome: Progressing Towards Goal  Goal: Respiratory  Outcome: Progressing Towards Goal  Goal: Treatments/Interventions/Procedures  Outcome: Progressing Towards Goal  Goal: Psychosocial  Outcome: Progressing Towards Goal     Problem: Simple Spine Procedure: Discharge Outcomes  Goal: *Optimal pain control at patient's stated goal  Outcome: Progressing Towards Goal  Goal: *Demonstrates ability to place and remove stabilization device  Outcome: Progressing Towards Goal  Goal: *Progress independence mobility/activities (eg: Mobility precautions)  Outcome: Progressing Towards Goal  Goal: *Resumes normal function of bladder and bowel  Outcome: Progressing Towards Goal  Goal: *Lungs clear or at baseline  Outcome: Progressing Towards Goal  Goal: *Verbalizes name, dosage, time, side effects, and number of days to continue medications  Outcome: Progressing Towards Goal  Goal: *Modified independence with transfers, ambulation on levels with assistance devices, stair climbing, ADL's  Outcome: Progressing Towards Goal  Goal: *Describes follow-up/return visits to physicians  Outcome: Progressing Towards Goal  Goal: *Describes available resources and support systems  Outcome: Progressing Towards Goal  Goal: *Labs within defined limits  Outcome: Progressing Towards Goal  Goal: *Tolerating diet  Outcome: Progressing Towards Goal

## 2021-04-20 NOTE — PROGRESS NOTES
ORTHOPAEDIC CERVICAL FUSION PROGRESS NOTE    NAME:     Mami Level   :       1990   MRN:       [id number]   DATE:      2021    POD:              1 Day Post-Op  S/P:              Procedure(s):  C5-C6  ANTERIOR CERVICAL DISCECTOMY WITH FUSION WITH INSTRUMENTATION    SUBJECTIVE:  C/O sore throat   No arm pain or numbness  Denies nausea/vomiting, headache, chest pain or shortness of breath  Pain controlled    Recent Labs     21  0617   HGB 13.1      K 4.0      CO2 28   BUN 8   CREA 0.70   *     Patient Vitals for the past 12 hrs:   BP Temp Pulse Resp SpO2   21 0822 130/83 98.6 °F (37 °C) 90 17 100 %   21 0325 124/71 98.8 °F (37.1 °C) 90 16 98 %   21 2313 126/62 99.1 °F (37.3 °C) 98 16 99 %       Exam:  Soft collar inplace / intact  Dressings clean and dry  Positive strength/ROM bilat upper ext.   Neuro intact to sensation  BL UEs NVID    PLAN:  Continue PO pain medications as needed  Chloraseptic spray at bedside  Advance diet as tolerated  Out of bed w/ assist  Likely D/C to home today      Mark Bartholomew [place]  Orthopaedic Surgery  Physician Assistant to Dr. Mario Sher

## 2021-04-26 NOTE — DISCHARGE SUMMARY
DISCHARGE SUMMARY     Patient: Catherine Osuna                             Medical Record Number: [id number]                : 1990  Age: 27 y.o. Admit Date: 2021  Discharge Date: 2021  Admission Diagnosis: Cervical disc displacement [M50.20]  Cervical spondylosis with myelopathy and radiculopathy [M47.12, M47.22]  Discharge Diagnosis: Cervical disc displacement [M50.20]  Procedures: Procedure(s):  C5-C6  ANTERIOR CERVICAL DISCECTOMY WITH FUSION WITH INSTRUMENTATION  Surgeon: Kate Lindo MD  Anesthesia: General  Complications: None     History of Present Illness:  Catherine Osuna is a 27 y.o. female with a history of intractable neck pain and radiculopathy. Despite conservative management and after clinical and radiographic evaluation, it was determined that she suffered from cervical stenosis and would benefit from Procedure(s):  C5-C6  ANTERIOR CERVICAL DISCECTOMY WITH FUSION WITH INSTRUMENTATION, which she consented to undergo after a discussion of the risks, benefits, alternatives, rehab concerns, and potential complications of surgery. Hospital Course:  Teagan Biswas tolerated the procedure well. She was transferred  to the recovery room in stable condition. After a brief stay, the patient was then transferred to the Orthopedic floor. On postoperative day #1, the dressing was clean and dry and she was neurovascularly intact. The patient was afebrile and vital signs were stable. Catherine Osuna was deemed able to be discharged to Home  in stable condition on postoperative day 1. She was provided with routine postoperative instructions and advised to follow up in my office in 3 weeks following discharge from the hospital.  She was given a brace and prescriptions for medication to control post-operative symptoms.     Discharge Medications:  Discharge Medication List as of 2021  9:20 AM      START taking these medications    Details   docusate sodium (COLACE) 100 mg capsule Take 1 Cap by mouth two (2) times a day., Normal, Disp-60 Cap, R-0      oxyCODONE IR (ROXICODONE) 5 mg immediate release tablet Take 1-2 Tabs by mouth every six (6) hours as needed for Pain for up to 7 days. Max Daily Amount: 40 mg. Indications: pain, Normal, Disp-50 Tab, R-0S/p Orthopaedic Surgery. Call [phone] with questions. cyclobenzaprine (FLEXERIL) 10 mg tablet Take 1 Tab by mouth three (3) times daily as needed for Muscle Spasm(s). , Normal, Disp-30 Tab, R-0Call [phone] with questions         CONTINUE these medications which have NOT CHANGED    Details   cholecalciferol (Vitamin D3) (5000 Units/125 mcg) tab tablet Take 2 Tabs by mouth daily for 30 days.  Indications: low vitamin D levels, Normal, Disp-60 Tab, R-0         STOP taking these medications       trazodone HCl (TRAZODONE PO) Comments:   Reason for Stopping:         methocarbamoL (Robaxin) 500 mg tablet Comments:   Reason for Stopping:               KAREN Delgado  4/20/2021  Orthopaedic Surgery  Physician Assistant to Dr. Sydnee Calvert

## 2022-01-24 ENCOUNTER — TRANSCRIBE ORDER (OUTPATIENT)
Dept: SCHEDULING | Age: 32
End: 2022-01-24

## 2022-01-24 DIAGNOSIS — S63.641A: Primary | ICD-10-CM

## 2022-01-25 ENCOUNTER — TRANSCRIBE ORDER (OUTPATIENT)
Dept: SCHEDULING | Age: 32
End: 2022-01-25

## 2022-01-25 DIAGNOSIS — S63.641A RUPTURE OF ULNAR COLLATERAL LIGAMENT OF RIGHT THUMB, INITIAL ENCOUNTER: Primary | ICD-10-CM

## (undated) DEVICE — 1010 S-DRAPE TOWEL DRAPE 10/BX: Brand: STERI-DRAPE™

## (undated) DEVICE — TUBING, SUCTION, 1/4" X 10', STRAIGHT: Brand: MEDLINE

## (undated) DEVICE — STERILE POLYISOPRENE POWDER-FREE SURGICAL GLOVES: Brand: PROTEXIS

## (undated) DEVICE — C-ARM: Brand: UNBRANDED

## (undated) DEVICE — APPLICATOR MEDICATED 10.5 CC SOLUTION HI LT ORNG CHLORAPREP

## (undated) DEVICE — STERILE POLYISOPRENE POWDER-FREE SURGICAL GLOVES WITH EMOLLIENT COATING: Brand: PROTEXIS

## (undated) DEVICE — ACCY PA100-A LEGEND LUB/DIFFUSER 4 PACK: Brand: MIDAS REX®

## (undated) DEVICE — SEALANT HEMOSTAT W/THROM 8ML -- SURGIFLO MATRIX

## (undated) DEVICE — STRIP,CLOSURE,WOUND,MEDI-STRIP,1/2X4: Brand: MEDLINE

## (undated) DEVICE — BIT DRL L12MM DIA2.3MM CERV STP W/ EPOXY RNG DISP PYRENEES

## (undated) DEVICE — SURGICAL PROCEDURE PACK BASIN MAJ SET CUST NO CAUT

## (undated) DEVICE — 3M™ IOBAN™ 2 ANTIMICROBIAL INCISE DRAPE 6650EZ: Brand: IOBAN™ 2

## (undated) DEVICE — COVER,MAYO STAND,STERILE: Brand: MEDLINE

## (undated) DEVICE — CATHETER IV 14GA L1.25IN TEF STR HUB INTROCAN SFTY

## (undated) DEVICE — 450 ML BOTTLE OF 0.05% CHLORHEXIDINE GLUCONATE IN 99.95% STERILE WATER FOR IRRIGATION, USP AND APPLICATOR.: Brand: IRRISEPT ANTIMICROBIAL WOUND LAVAGE

## (undated) DEVICE — SOL IRR SOD CL 0.9% 1000ML BTL --

## (undated) DEVICE — DRAPE,REIN 53X77,STERILE: Brand: MEDLINE

## (undated) DEVICE — TIP CLEANER: Brand: VALLEYLAB

## (undated) DEVICE — SOL IRR STRL H2O 1000ML BTL --

## (undated) DEVICE — SYR 5ML 1/5 GRAD LL NSAF LF --

## (undated) DEVICE — SUTURE VCRL SZ 3-0 L27IN ABSRB UD L26MM SH 1/2 CIR J416H

## (undated) DEVICE — PACK,BASIC,SIRUS,V: Brand: MEDLINE

## (undated) DEVICE — ROCKER SWITCH PENCIL BLADE ELECTRODE, HOLSTER: Brand: EDGE

## (undated) DEVICE — TAPE,CLOTH/SILK,CURAD,3"X10YD,LF,40/CS: Brand: CURAD

## (undated) DEVICE — PAD,NON-ADHERENT,3X8,STERILE,LF,1/PK: Brand: MEDLINE

## (undated) DEVICE — SPONGE GZ W4XL4IN COT 12 PLY TYP VII WVN C FLD DSGN

## (undated) DEVICE — CANISTER, RIGID, 3000CC: Brand: MEDLINE INDUSTRIES, INC.

## (undated) DEVICE — CODMAN® SURGICAL PATTIES 3/4" X 3/4" (1.91CM X 1.91CM): Brand: CODMAN®

## (undated) DEVICE — COVER,MAYO STAND,XL,STERILE: Brand: MEDLINE

## (undated) DEVICE — BONE WAX WHITE: Brand: BONE WAX WHITE

## (undated) DEVICE — SYR 20ML LL STRL LF --

## (undated) DEVICE — COVER,TABLE,60X90,STERILE: Brand: MEDLINE

## (undated) DEVICE — DRAPE,CHEST,FENES,15X10,STERIL: Brand: MEDLINE

## (undated) DEVICE — TOOL 14MH30 LEGEND 14CM 3MM: Brand: MIDAS REX ™

## (undated) DEVICE — STRAP,POSITIONING,KNEE/BODY,FOAM,4X60": Brand: MEDLINE

## (undated) DEVICE — REM POLYHESIVE ADULT PATIENT RETURN ELECTRODE: Brand: VALLEYLAB

## (undated) DEVICE — SUTURE MCRYL SZ 4-0 L27IN ABSRB UD L19MM PS-2 1/2 CIR PRIM Y426H

## (undated) DEVICE — DRAPE,UTILITY,TAPE,15X26,STERILE: Brand: MEDLINE

## (undated) DEVICE — NDL SPNE QNCKE 18GX3.5IN LF --

## (undated) DEVICE — INFECTION CONTROL KIT SYS

## (undated) DEVICE — SPONGE: SPECIALTY PEANUT XR 100/CS: Brand: MEDICAL ACTION INDUSTRIES

## (undated) DEVICE — 3M™ TEGADERM™ TRANSPARENT FILM DRESSING FRAME STYLE, 1624W, 2-3/8 IN X 2-3/4 IN (6 CM X 7 CM), 100/CT 4CT/CASE: Brand: 3M™ TEGADERM™

## (undated) DEVICE — MAGNETIC INSTR DRAPE 20X16: Brand: MEDLINE INDUSTRIES, INC.

## (undated) DEVICE — 3M™ TEGADERM™ TRANSPARENT FILM DRESSING FRAME STYLE, 1626W, 4 IN X 4-3/4 IN (10 CM X 12 CM), 50/CT 4CT/CASE: Brand: 3M™ TEGADERM™

## (undated) DEVICE — ALCOHOL RUBBING ISO 16OZ 70%

## (undated) DEVICE — INSULATED BLADE ELECTRODE: Brand: EDGE

## (undated) DEVICE — GOWN,SIRUS,NONRNF,SETINSLV,XL,20/CS: Brand: MEDLINE

## (undated) DEVICE — DRAIN KT WND 10FR RND 400ML --

## (undated) DEVICE — DRAPE MICSCP W46XL120IN FOR ZEISS MD FEATURING CLEARLENS

## (undated) DEVICE — BIPOLAR FORCEPS CORD: Brand: VALLEYLAB

## (undated) DEVICE — COVER LT HNDL PLAS RIG 1 PER PK